# Patient Record
(demographics unavailable — no encounter records)

---

## 2024-11-13 NOTE — CARDIOLOGY SUMMARY
[Normal] : normal [___] : [unfilled] [No Ischemia] : no Ischemia [LVEF ___%] : LVEF [unfilled]% [de-identified] : Sinus  Rhythm  -Left axis -anterior fascicular block.   [de-identified] : November 8, 2023 Normal LV function LVEF

## 2024-11-13 NOTE — PHYSICAL EXAM
[Well Developed] : well developed [Well Nourished] : well nourished [No Acute Distress] : no acute distress [Normal Venous Pressure] : normal venous pressure [No Carotid Bruit] : no carotid bruit [Normal S1, S2] : normal S1, S2 [No Rub] : no rub [No Gallop] : no gallop [Clear Lung Fields] : clear lung fields [Good Air Entry] : good air entry [No Respiratory Distress] : no respiratory distress  [Soft] : abdomen soft [Non Tender] : non-tender [No Masses/organomegaly] : no masses/organomegaly [Normal Bowel Sounds] : normal bowel sounds [Normal Gait] : normal gait [No Edema] : no edema [No Cyanosis] : no cyanosis [No Clubbing] : no clubbing [No Varicosities] : no varicosities [No Rash] : no rash [No Skin Lesions] : no skin lesions [Moves all extremities] : moves all extremities [No Focal Deficits] : no focal deficits [Normal Speech] : normal speech [Alert and Oriented] : alert and oriented [Normal memory] : normal memory [General Appearance - Well Developed] : well developed [Normal Appearance] : normal appearance [Well Groomed] : well groomed [General Appearance - Well Nourished] : well nourished [No Deformities] : no deformities [General Appearance - In No Acute Distress] : no acute distress [Normal Conjunctiva] : the conjunctiva exhibited no abnormalities [Eyelids - No Xanthelasma] : the eyelids demonstrated no xanthelasmas [Normal Oral Mucosa] : normal oral mucosa [No Oral Pallor] : no oral pallor [No Oral Cyanosis] : no oral cyanosis [Normal Jugular Venous A Waves Present] : normal jugular venous A waves present [Normal Jugular Venous V Waves Present] : normal jugular venous V waves present [No Jugular Venous Casper A Waves] : no jugular venous casper A waves [Respiration, Rhythm And Depth] : normal respiratory rhythm and effort [Exaggerated Use Of Accessory Muscles For Inspiration] : no accessory muscle use [Auscultation Breath Sounds / Voice Sounds] : lungs were clear to auscultation bilaterally [Abdomen Soft] : soft [Abdomen Tenderness] : non-tender [Abdomen Mass (___ Cm)] : no abdominal mass palpated [Abnormal Walk] : normal gait [Gait - Sufficient For Exercise Testing] : the gait was sufficient for exercise testing [Nail Clubbing] : no clubbing of the fingernails [Cyanosis, Localized] : no localized cyanosis [Petechial Hemorrhages (___cm)] : no petechial hemorrhages [Skin Color & Pigmentation] : normal skin color and pigmentation [] : no rash [No Venous Stasis] : no venous stasis [Skin Lesions] : no skin lesions [No Skin Ulcers] : no skin ulcer [No Xanthoma] : no  xanthoma was observed [Oriented To Time, Place, And Person] : oriented to person, place, and time [Affect] : the affect was normal [Mood] : the mood was normal [No Anxiety] : not feeling anxious [Normal] : normal [Normal Rate] : normal [Rhythm Regular] : regular [No Murmur] : no murmurs heard [2+] : left 2+ [No Pitting Edema] : no pitting edema present

## 2024-11-13 NOTE — ASSESSMENT
[FreeTextEntry1] :  is now 67 years old and returns for a cardiovascular follow up. She carries a medical history as outlined below: -Hx of Hypertension -Hx of Hypothyroidism -Hx of SLE/ Rheumatoid arthritis -Hx of Right sided breast cancer (1996)-> S/P lumpectomy/Radiation/Chemo -Hx of Right sided breast cancer recurrence (2008)-> mastectomy/Radiation/ Chemo (Herceptin) -Hx of Left sided breast cancer - DCIS-(2016)-> S/P lumpectomy -> Tamoxifen X 5 years -Hx of S/P Left Total Knee Replacement-> 2022   #Hypertension   Blood pressure is within acceptable limits    Continue on Losartan 75 mg QD    Continue on HCTZ 25 mg QD    Requested BP monitoring at home  # Hypothyroidism    Reviewed TSH-> November 7, 2023, 1.92   Continue on Levothyroxine  75 mcg QD    Will repeat TSH  #Hx of RA   Continuing on Plaquenil 200 mg BID   #History of breast cancer-> s/p Chemo and radiation/ At risk for cardiomyopathy   Echocardiogram performed : November 8, 2023   Echo results:   Normal LV function   Normal diastolic function   No valvular issues   GLS- 21.3%  - Encouraged patient to continue with short intervals of   healthy walking and eating habits, focusing on a Mediterranean style of eating.  - Encouraged the patient to find healthy outlets and coping mechanisms to help manage stress, such as reducing workload if possible, spending time with family and friends, engaging in an enjoyable hobby, or using meditation or mindfulness techniques.  Repeating full blood work panel: CBC, CMP, Hgb A1C, TSH, Lipid panel  I spent 40 minutes evaluating patient's history, family medical history and blood pressure management, ordering tests and providing documentation.

## 2024-11-13 NOTE — REASON FOR VISIT
[Symptom and Test Evaluation] : symptom and test evaluation [Hyperlipidemia] : hyperlipidemia [Hypertension] : hypertension [Coronary Artery Disease] : coronary artery disease [FreeTextEntry1] : cardio-oncology, HLD, Hypothyroidism, lupus, rheumatoid , breast cancer, DMII

## 2024-11-13 NOTE — HISTORY OF PRESENT ILLNESS
[FreeTextEntry1] : 66  year old  women with: Hyperlipidemia elevated statin  HTN  on medication  Hypothyroidism  SLE  following with Rheum  Dr. Kasper  Rheumatoid arthritis- Dr Pricila Montiel PCP - Dr Micaela Erazo   Hx of  Right breast cancer initially diagnosed in 1996 s/p lumpectomy/XRT /chemotherapy with subsequent recurrence of breast CA noted in 2008 s/p mastectomy/XRT/chemotherapy (Herceptin).  DCIS in her left breast in December of 2016 s/p  lumpectomy on December 21, 2016 with no reported lymph node involvement. Breast surgeon, Dr. Anette Richardson ([facility] [place]She was started on Tamoxifen x 5 years s/p knee replacement.  Cardiac Testing: Nuclear exercise stress test in July of 2016 which ruled out any inducible ischemia. Echo 2016 Normal LV function. EF 69% Atrial septum aneurysmal, Mild shunting noted across the atrium septum  Interval follow-up February 24, 2021 Complains of positional dizziness Also here for routine cardio oncology follow-up ROS: Denies Chest pain, dyspnea, palpitations or syncope.  Interval follow up 9/3/21 vaccinate against covid compliant with medication/ diet  No recent hospitalization   Scale of 1/10: 5 sharp last 5 mins  also SOB with exertion No radiation to jaw, back arm Associated diaphoresis, palpitations, dizzy Alleviating factors: rest  Exacerbating factors:  activity /stairs etc   Also swelling in right index finger  on prednisone. Plaquenil   ROS: Denies  syncope; No recent hospitalization   Interval follow up 12/1/21 no more CP stress echo normal compliant with medication  tolerating statin   Interval Note September 7, 2022  Patient is now 66 years old that presents for a cardiovascular follow up. She carries a past medical history as outlined below:  -Hx of Hypertension -Hx hyperlipidemia -Hx of Hypothyroidism -Hx of SLE/ Rheumatoid arthritis -Hx of Right sided breast cancer (1996)-> S/P lumpectomy/Radiation/Chemo -Hx of Right sided breast cancer recurrence (2008)-> mastectomy/Radiation/ Chemo (Herceptin) -Hx of Left sided breast cancer - DCIS-(2016)-> S/P lumpectomy -> Tamoxifen X 5 years -Hx of S/P Left Total Knee Replacement-> 2022  Doing well.  PET- + LNs  Bronch Lymph nodes biopsy negative   newly diabetic hbaic 6.8 diet changes bean, rice, bread, sugar  compliant with medication  compliant with diet No recent hospitalization  No new medication   wants a endocrine recommendation  Interval Note May 10, 2023  Ms. Beasley is now 67 years old and returns for follow up Patient recently was seen by rheumatology due to arthralgias in her hands, writs and knees. Given a trigger injection of steroids into her left index finger. She was restarted on Plaquenil and began Prednisone 2.5 mg daily.  Blood pressure today: mildly elevated-> 143/ 84 EKG- Sinus rhythm @ 81 bpm,  stable and unchanged  Reviewed stress echo performed on September 29, 2021 LVEF  69% 7 METS of activity, fair for age and gender Normal stress echo with no evidence of inducible ischemia.   Patient otherwise reports feeling well, denies shortness of breath, chest discomfort or palpitations.   Interval Note November 8, 2023   is now 67 years old and returns for a cardiovascular follow up. She carries a medical history as outlined below: -Hx of Hypertension -Hx of Hypothyroidism -Hx of SLE/ Rheumatoid arthritis -Hx of Right sided breast cancer (1996)-> S/P lumpectomy/Radiation/Chemo -Hx of Right sided breast cancer recurrence (2008)-> mastectomy/Radiation/ Chemo (Herceptin) -Hx of Left sided breast cancer - DCIS-(2016)-> S/P lumpectomy -> Tamoxifen X 5 years -Hx of S/P Left Total Knee Replacement-> 2022  Blood pressure today:  146/ 82-> patient did not take her BP medication today EKG- Sinus rhythm @ 74 bpm   Patient presents today with ongoing complaints of numbness and tingling in her hands. She is being evaluated by neurologist, Dr. Robert Dias Recent Cervical MRI- demonstrated some cervical degeneration which may be contributory to these symptoms. Patient is scheduled to repeat a nerve conduction velocity test and EMG examination of the upper extremities to check progress of her carpal tunnel syndrome.  Interval Note May 8, 2024  Ms. Beasley is now 68 years old and returns for a cardiovascular follow up. She carries a history as outlined below: -Hx of Hypertension -Hx of Hypothyroidism -Hx of SLE/ Rheumatoid arthritis -Hx of Sjogren's -Hx of Right sided breast cancer (1996)-> S/P lumpectomy/Radiation/Chemo -Hx of Right sided breast cancer recurrence (2008)-> mastectomy/Radiation/ Chemo (Herceptin) -Hx of Left sided breast cancer - DCIS-(2016)-> S/P lumpectomy -> Tamoxifen X 5 years -Hx of S/P Left Total Knee Replacement-> 2022  Primary Care Doctor- Dr. Micaela Erazo Rheumatologist:  Dr. Lili Fiore  Blood pressure today:143/86-> repeat measurement: 134/74 EKG- Sinus rhythm, left axis @ 76 bpm   Reviewed recent echocardiogram from November 2023 Normal LV systolic function GLS  -21.3%  (normal) Normal LV diastolic function Normal RV cavity size, wall thickness and systolic function No pericardial effusion Mild AR Stable and unchanged.  Patient reports feeling well, continues to experience some chronic arthralgias-> left shoulder pain Her main complaints are centered around her knees due to laxity symptoms.  Interval Note November 13, 2024  Ms. Beasley returns for a cardiovascular follow up. She carries a history as outlined below:  -Hx of Hypertension -Hx of Hypothyroidism -Hx of SLE/ Rheumatoid arthritis -Hx of Sjogren's -Hx of Right sided breast cancer (1996)-> S/P lumpectomy/Radiation/Chemo -Hx of Right sided breast cancer recurrence (2008)-> mastectomy/Radiation/ Chemo (Herceptin) -Hx of Left sided breast cancer - DCIS-(2016)-> S/P lumpectomy -> Tamoxifen X 5 years -Hx of S/P Left Total Knee Replacement-> 2022  Blood pressure today: 149/82 EKG

## 2025-02-24 NOTE — ASSESSMENT
[FreeTextEntry1] : cts  worsening will need to see hand specialist and discussed treatment such as surgery she asked for a new  referral and wanted [facility] hand specilist   CTS is considered mild if there is numbness, tingling, or discomfort in the median nerve distribution but no sensory loss or weakness, no sleep disruption, and no difficulty with hand function or interference with activities of daily living (ADLs).   CTS is considered moderate if there is sensory loss in the median distribution, or if nocturnal symptoms occasionally disrupt sleep. Symptoms (sensory loss or pain) may interfere slightly with hand function, but the patient should be able to perform all ADLs.   CTS is considered severe if there is weakness in the median distribution, or if symptoms are disabling and prevent the patient from carrying out one or more ADLs, or if nocturnal symptoms routinely disrupt sleep.   ?Electrodiagnostic grading of CTS severity [1]:   Mild CTS is characterized by prolonged (relative or absolute) sensory latencies with normal motor studies. No evidence for axon loss.   Moderate CTS is characterized by abnormal median sensory latencies as noted for mild CTS, and relative or absolute prolongation of median motor distal latency. No evidence of axon loss.   Severe CTS is characterized by evidence of axon loss, as defined by any of the following:   -An absent or low-amplitude sensory nerve action potential (SNAP) or mixed nerve action potential   -A low-amplitude or absent thenar compound muscle action potential (CMAP)   -Needle electromyography (EMG) with fibrillation potentials or motor unit potential changes (large amplitude, long-duration motor unit potentials, or excessive polyphasics)   Initial therapy  Surgical decompression is advised for most patients with CTS who have severe median nerve injury, characterized by significant axonal degeneration on nerve conduction studies (NCS) or denervation on needle EMG (ie, reinnervated motor units and fibrillation potentials), unless there is a clear temporary precipitating factor such as pregnancy. Those who lack evidence of significant axonal loss or denervation can be treated initially with nonsurgical measures.  For patients with a clinical diagnosis of CTS who have not had electrodiagnostic studies, initial nonsurgical therapy is reasonable if clinical symptoms are mild (table 1). However, those with moderate to severe clinical symptoms who are candidates for surgery should have electrodiagnostic studies first to determine if there is significant axonal loss that might prompt a surgical referral, or if another etiology for median nerve damage can be determined. Patients lacking the electrodiagnostic features of axon loss can be treated initially with nonsurgical measures, even in the presence of severe clinical symptoms.  Among nonsurgical treatments, we suggest either nocturnal wrist splinting in the neutral position or a glucocorticoid injection as initial therapy. Both achieve similar results at six months, and an injection may offer slightly more short-term relief of symptoms at six weeks. Referral to an occupational therapist with subspecialty certification in hand therapy may improve outcomes. (See 'Wrist splinting' below.)  Failure of initial nonsurgical therapy  Although evidence is limited, combined treatment employing splinting with glucocorticoid injection(s), oral glucocorticoids, or other nonsurgical interventions may be more effective than the use of any single modality [2-5].  For patients who choose initial nocturnal splinting but remain symptomatic at one month, we suggest continuation of splinting for another one to two months while adding a different nonsurgical modality for CTS rather than stopping splinting. We suggest adding a single injection of methylprednisolone (20 to 40 mg) as the next therapeutic option; for patients who decline injection therapy, we suggest adding oral glucocorticoids (eg, prednisone 20 mg daily for 10 to 14 days). Oral glucocorticoid treatment should not extend beyond four weeks duration because of the deleterious side effects of prolonged glucocorticoid therapy. (See 'Glucocorticoid injection' below and 'Oral glucocorticoids' below.)  For patients who decline treatment with glucocorticoids, we suggest adding treatment with other measures such as physical and occupational therapy techniques (eg, carpal bone mobilization or nerve gliding) or yoga if available. However, the evidence of benefit for these treatment options is limited. (See 'Other nonsurgical options' below.)  Predictors associated with failure of conservative/nonsurgical therapy include the following [2,6-8]:  ?Long duration of symptoms (>6 to 12 months)  ?Age greater than 50 years  ?Constant paresthesia  ?Impaired two-point discrimination (>6 mm)  ?Positive Phalen sign <30 seconds  ?Prolonged motor and sensory latencies demonstrated by electrodiagnostic testing   Furthermore, clinical experience suggests that patients with significant evidence of nerve injury on electrodiagnostic studies (axonal degeneration on NCS or denervation on needle EMG) seldom benefit from splinting or other nonsurgical interventions for CTS. Surgical decompression may be beneficial even for patients with CTS who lack evidence of axonal loss or denervation if symptoms do not respond to an adequate trial of nonsurgical measures. (See 'Surgical decompression' below.)  Electrodiagnostic studies should be obtained prior to surgical treatment 2 prediabetes  GLYCEMIC INDEX: Foods can be measured by how much they are likely to raise blood sugar. This is called the glycemic index. We want you to eat mostly foods that have a low glycemic index.  Fruit: choose cherries, grapefruit, prunes, dried apricots, apples, peaches, pears, blueberries, strawberries, oranges, and grapes.  Breads and other starches: Choose pumpernickel, rye, sourdough, or stone-ground whole wheat bread. For cereal, choose bran flakes, oat flakes, and oatmeal. For rice, use long-grained white rice. Most pasta is fairly low on the glycemic index; whole wheat or egg pasta is the lowest. Choose new or sweet potatoes and yams.  Dairy products: Dairy tends to be fairly low on the glycemic index because of the protein and fat in it. Premium ice cream is lower on the glycemic index than low-fat ice cream.  Salty snacks: Hummus, peanuts, walnuts, and cashews are all good choices.  Sweets: Most sweets are high on the glycemic index, so make them special treats only. Ones that have protein and fat in them tend to be a bit lower. Milk chocolate or peanut candies are good choices. Pound and sponge cakes are lower on the glycemic index than other types of cakes. For cookies, choose peanut butter, chocolate chip, butter, and oatmeal cookies. For a breakfast treat, choose scones or oatmeal muffins.  Beans: Choose [place], chickpeas (garbanzo beans), lentils, split peas, lima beans, and kidney beans.  Meat and vegetables are low on the glycemic index. Soups and stews made with meat or vegetables are also good. 3 hypothyroid check levels  4 breast nodule us breast and mammogram  5 colon cancer screening [place] in . [place] controlled   DIETARY SALT (SODIUM); DASH DIET AND BLOOD PRESSURE: To decrease the sodium in your diet:   Use fresh vegetables and foods as much as possible.  Avoid canned and processed foods. Cured meats such as quick, ham, and sausages are high in salt.  Try using different herbs and spices in your cooking instead of salt.  In restaurants, avoid foods with sauces, cheese, and cured meats. Ask for low-sodium choices. To get more potassium in your diet, eat:  Bananas, fresh or dried apricots, peaches, citrus fruits, melons  Cauliflower, broccoli, tomatoes, carrots, raw spinach, beet greens, potatoes To get more magnesium in your diet, eat:  Whole grain foods, leafy green vegetables, dried fruits  Fish and seafood, poultry  To get more calcium in your diet, eat:  Nonfat milk, yogurt, and low-fat cheeses   [place] and sardines  Cooked dried beans  Broccoli, kale, and bok manny  Tofu or soybean curd DASH stands for "dietary approaches to stop hypertension." The DASH diet is low in total and saturated fat. It is rich in fruits, vegetables, and low-fat dairy foods. The diet allows you to get natural fiber, calcium, and magnesium from food. It prevents or lowers high blood pressure. It can also help lower cholesterol in your blood.  Don't change how you eat all at once. It's much more likely that you'll succeed if you make only one or two small changes at a time. Wait until those changes are a habit, then make a couple more changes. Some good starting steps include:  Add one serving of vegetables to your meals at lunch and dinner. This is an easy way to help you get more vegetables in your diet.  Have a piece of fruit as an afternoon or after-dinner snack. One glass of juice at breakfast is not enough fruit in your diet.  Use half your usual amount of butter, margarine, or salad dressing.  Buy nonfat salad dressing or nonfat sour cream. Follow this guide to select your menu of meals. The number of calories we want you to eat each day will tell you how many servings you can choose from each food group. Calories: 1600 2100 2600 3100 Servings Grains 6 7  10  12  Vegetables 	 4 4  5 6 Fruit 4 5 5 6 Dairy (low-fat) 2  3 3 3  Meat, poultry, fish  1  2 2  Nuts, seeds    1 Fats and sweets 1  2  3 4 Grains and grain products like breads and cereals provide energy, fiber and vitamins. Whole grains have more of these nutrients. One serving equals one of the following: Bagel, 1/2 medium; Barley, cooked 1/2 cup; Biscuit, country style 1 medium; Bread, whole wheat, white 1 slice; Cereals, cold or cooked, 1/2 cup; Cornbread, 1 medium piece; Crackers, brittani, 2; Crackers, saltine, 4; Dinner roll, 1medium; English muffin, ; Hamburger bun, ; Muffin, 1 medium; Pancake, 1 medium; Pasta, 1/2 cup cooked; Chrissy, 1/2 large or 1 small; Popcorn, 1 cup popped; Pretzels, 1 ounce; Rice, white, brown, or wild, 1/2 cup cooked; Tortillas, corn or flour, 1 medium; Waffle, 1 medium; Wheat germ, 1/4 cup;  Vegetables are rich sources of potassium, magnesium, and fiber. One serving is 1/2 cup of any of the following cooked vegetables: Asparagus, Beans (green, yellow), Beets, Broccoli, [place] Sprouts, Carrots, Cauliflower, Brice, chicory, mustard and turnip (and other) greens, Corn, Kale, Lima beans, Mixed vegetables, Okra, Parsnips, Peas, green, Potatoes (1/2 medium or 1/2 cup mashed), Pumpkin, Rutabaga, Spaghetti or tomato sauce, Spinach, Squash (zucchini or yellow), Stewed tomatoes, Succotash, Sweet potatoes, Turnips, Yam  Raw vegetables: Carrots,1/2 cup; Celery, 1/2 cup; Lettuce (porter, loose-leaf, green-leaf), 1 cup; Peppers, 1/2 cup; Spinach, 1 cup; Tomato, 1/2 Fruits and fruit juices are important sources of potassium and magnesium. Fruits also contain fiber and are low in sodium and fat. One serving equals: Any fruit juice, # cup (6 ounces); Canned or frozen fruit,  cup (includes applesauce); Dried fruit,  cup;  Fresh fruit: Apple, 1 medium; Apricots, 2 medium; Banana, 1 medium; Berries, 1/2 cup; Melon, 1 wedge, or 1/2 cup; Cherries, 10; Grapefruit, 1/2; Grapes, 15; Kiwi, 1 medium; Edwin, 1/2 small; Nectarine, 1 medium; Orange, 1 medium; Peach, 1 medium; Pear, 1 medium; Pineapple, 1/2 cup; Plums, 2 medium; Tangerine, 1 large Dairy foods provide protein and calcium. Use low-fat or nonfat dairy products to cut down on fat. One serving equals: Skim milk, 1 cup (8 ounces); 1% low fat milk, 1 cup (8 ounces); 2% low fat milk, 1 cup (8 ounces) nonfat dry milk powder (1/3 cup); Low-fat cottage cheese, 1 cup (8 ounces); Parmesan cheese, 1 tablespoon; Mozzarella cheese, part skim, 1/4 cup (1 ounce); Low-fat cheddar cheese, 11/2 ounces; Ricotta cheese, part skim milk or nonfat, 1/4 cup (11/2 ounces); Other low fat or nonfat cheeses (11/2 oz.); Low-fat or nonfat yogurt, fruit-flavored or plain, 1 cup (8 ounces) Low-fat or nonfat frozen yogurt, 1/2 cup (4 ounces); Note: People who can't digest dairy products can try taking lactase enzyme pills or drops (available at drug and grocery stores) when they eat dairy. There is also milk available with the enzyme already added. Or you can buy lactose-free milk. Meat, poultry, and fish are good sources of protein and magnesium. One serving equals: Lean meat including beef, veal, or pork, 3 ounces cooked; Skinless, white meat poultry including turkey, chicken, 3 ounces; Fish and shellfish, 3 ounces cooked; Low-fat luncheon meats, 1 ounce; Egg, 1 medium; Tofu, 3 ounces Note: Three ounces of cooked meat is about the size of a deck of cards. Nuts, seeds, and legumes are rich sources of energy, magnesium, potassium, protein and fiber. Nuts and seeds are also high in fat, so portions should be small. Almonds, 1/3 cup; Beans such as kidney, rios, and navy, 1/2 cup cooked; Chickpeas and lentils, 1/2 cup cooked; Cashews, 1/3 cup; Filberts, 1/3 cup; Mixed nuts, 1/3 cup; Peanut butter, 2 tablespoons; Peanuts, 1/3 cup; Sesame seeds, 2 tablespoons; Sunflower seeds, 2 tablespoons; Tofu, regular, 3 ounces; Walnuts, 1/3 cup  Following the above diet will give you about 27% fat in your diet. The goal is to have 30% or less of the calories you eat each day be from fat. To meet that goal, do not eat more than 2-3 servings daily of added fat. Also try to limit sweets. One serving equals: Butter or margarine, 1 teaspoon; Mayonnaise, 1 teaspoon; Low-fat mayonnaise, 1 tablespoon; Salad dressing, 1 tablespoon; Low-fat salad dressing, 2 tablespoons; Oil, 1 teaspoon (use olive, canola, safflower, or other vegetable oils); Candy, hard, 3 pieces; Jelly or jam, 1 tablespoon); Jell-O, 1/2 cup; Jelly beans, 1/2 ounce; Maple syrup, 1 tablespoon; Popsicle, 1; Sherbet or nonfat or low-fat frozen yogurt, 1/2 cup; Sugar, 1 tablespoon; Sugared lemonade or fruit punch, 1 cup (8 ounces); Note: Try diet fruit-flavored gelatin or frozen, canned, or fresh fruit for dessert.  Small amounts of alcohol may have benefits to the heart and blood pressure. However, excess use of alcohol can cause damage to the brain, liver and other organs. It can lead to high blood pressure. Drinking more than two drinks (15 ml) every day can raise your blood pressure. 15 ml of alcohol equals:   one 12-ounce bottle of beer   a half glass (5 ounces) of wine   1 ounce (one shot) of 100 proof hard liquor  7trigger finger   trigger finger- The goals of treatment of trigger finger are to alleviate pain and to allow smoother movement of the finger with flexion and extension. A variety of therapeutic options ranging from conservative to more invasive are presented below.  Overall approach  Our initial approach to therapy is usually to start with conservative interventions which include activity modification, splinting, and/or short-term nonsteroidal antiinflammatory drugs (NSAIDs). A local glucocorticoid injection may be offered to patients whose symptoms have not resolved with conservative management.  Patients who present with severe symptoms or frequent episodes of triggering may benefit from a glucocorticoid injection at the initial presentation. Surgical release is generally reserved for patients who have failed conservative therapy and have not improved with one or two glucocorticoid injections.  Acute symptoms  We suggest initial management with either activity modification or splinting. Patients may continue with normal activity but avoid potentially aggravating movements such as pinching or grasping of the fingers. Several small observational studies have also shown that immobilization of the metacarpophalangeal (MCP) joint with splinting with a metal finger splint or a custom made thermoplastic splint can also help alleviate pain and reduce triggering . As an example, a study including 28 patients with trigger finger treated with a custom thermoplastic splint found statistically significant improvements across a variety of outcome measures, including the number of triggering events]. In addition, approximately 93 percent of patients felt that their triggering had improved after 6 to 10 weeks of splint wear, and 54 percent of patients felt that their symptoms had completely resolved.  The splint should keep the MCP joint in slight flexion and can be worn based on trigger pattern and patient preferences (eg, daytime use, nighttime use, or use with activity). The suggested duration of splinting is generally three to six weeks. For milder cases, primary care clinicians may suggest taping the affected finger with the adjacent normal fingers (yousuf taping) to limit flexion of the finger.  We also suggest a concurrent trial of NSAIDs for pain relief, unless contraindicated by gastrointestinal, renal, or heart disease. NSAIDs should be taken for a maximum duration of two to four weeks. (See "Initial treatment of rheumatoid arthritis in adults", section on 'NSAIDs'.)  Persistent symptoms  For patients whose symptoms do not improve with activity modification, splinting, and/or judicious use of NSAIDs, additional therapeutic options include glucocorticoid injections and surgery.  Glucocorticoid injection  A local glucocorticoid injection is suggested for patients whose symptoms have not resolved after four to six weeks of conservative therapy (eg, splinting)  We use an intermediate-acting glucocorticoid for injection such as methylprednisolone or triamcinolone mixed with a local anesthetic such as lidocaine. As mentioned above, a glucocorticoid injection may be offered sooner to patients who present with severe locking and incomplete finger flexion or extension. The injection may be repeated in six weeks if symptoms have not improved by at least 50 percent, with a maximum of three injections. However, the vast majority of patients experience significant improvement with a single injection. Persistent relief beyond 12 months is expected in approximately 50 percent of patients. Glucocorticoid injections for trigger finger in patients with diabetes are generally less successful .  The efficacy of glucocorticoid injections for trigger finger in adults has been demonstrated in numerous observational studies, with relatively lasting effects . As an example, a study including 366 patients with trigger finger found that at least 45 percent of patients experienced relief lasting up to 10 years after a single glucocorticoid injection  In addition, a systematic review including two randomized trials of poor methodological quality found that glucocorticoid injection was more effective for symptom relief than lidocaine injection alone after four weeks . Another trial randomly assigned 50 patients with trigger finger to receive either local glucocorticoid or placebo injection and found that patients who received a glucocorticoid injection had a significantly greater improvement in pain and reduction in triggering, which persisted during the 12-month follow-up period 8 sicca  In addition to the classic symptoms associated with Sjgren's syndrome (dry mouth and dry eyes), people with the disorder also have a higher risk of developing diseases of the lung (called interstitial pneumonitis), diseases of the kidneys (interstitial nephritis), and thyroid gland abnormalities. Some may develop inflammation of blood vessels (vasculitis). Vasculitis can cause a characteristic rash and can lead to skin, nerve, and/or internal organ damage. Sjgren's syndrome also increases the risk of a cancer of the lymphatic system (most commonly non-Hodgkin lymphoma). The lymphatic system includes the tissues and organs that produce and store cells that fight infection, including the bone marrow, spleen, thymus, and lymph nodes. (: Diffuse large B cell lymphoma in adults Follicular lymphoma in adultsThe skin may be affected by dryness (xerosis) and various types of rashes, including small "blood spots" on the lower legs (purpura, stemming from inflammation of the blood vessels), vasculitis (particularly small vessel involvement), and red ring-like lesions with a central pale area (annular erythema). Neurologic involvement includes damage to peripheral nerves, leading to uncomfortable sensations in the skin and/or distal parts of the extremities, such as the hands and feet, including burning, numbness, or discomfort with light touch. Less common forms can lead to imbalance and poor coordination. Sjgren's syndrome can also affect the brain and spinal cord. The most common form of this involvement is demyelination (damage to the myelin, which is the material that covers and protects nerves), leading to symptoms and signs similar to what is seen in multiple sclerosis. Blood involvement can result in low red blood cell counts or anemia (sometimes leading to fatigue and shortness of breath), low white cell counts (sometimes leading to frequent infections), and low platelet counts (sometimes leading to bleeding). SJGREN'S SYNDROME TREATMENTTreatment of Sjgren's syndrome can be divided into three basic areas?Treatment of problems such as oral yeast infections, eyelid irritation (blepharitis), and acid reflux. These problems can complicate Sjgren's syndrome and can make the condition less responsive to other therapies. ?Treatment of fatigue and/or vague symptoms of poor concentration and of impaired memory (such as fibromyalgia). Treatment of dry eyes and dry mouth  Most people use artificial tears dry eyes. Many different solutions are available; a clinician can recommend an appropriate choice based upon your pattern of dryness and fluid production in the eye. Some people are sensitive to the preservatives found in artificial tears ointments-patient-drug-information?you can try a brand with a non-irritating preservative. Alternatively, there are preservative-free versions of moisturizing eye drops. These should be used if you instill the drops in your eyes four or more times a day. They come in small, single-dose containers that may be hard to open for some people who have joint and/or vision problems. Prescription eye drops containing the medications cyclosporine  which suppress part of the local immune reaction, are also available. Some people with severe eye dryness require use of a tear made from their own serum. At night, you can use an eye ointment to provide moisture. It is important to use only a small amount (about 1/8 inches or 3 mm) of the ointment, because overuse can block the ducts and can lead to a condition called blepharitis. Some people try taking omega-3 fatty acid supplements (eg, fish oil) to help with their dry eyes, although evidence is mixed and it is not clear whether this is effective. Preserving natural tears  There are various measures you can try to preserve your own tears. [place] can be fitted on the sides of glasses, helping to protect the eyes from air and wind and reducing evaporation of tears. Goggles or wraparound sunglasses serve a similar function. Another approach is a simple procedure called punctal occlusion. In this procedure, an ophthalmologist inserts tiny plugs into the tear ducts in the corner of the lower eyelid, nearest the nose, where the tears normally collect and drain into the nose. By blocking this duct, your tears stay on the eye longer. There are several types of plugs, one of which does not touch the surface of the eyeball; these plugs are generally preferred. Stimulating saliva  Simply sucking on sugar-free candy or lozenges or chewing sugar-free gum can stimulate the flow of saliva. Products that contain xylitol can help reduce the risk for dental decay. In some people who do not respond adequately to such measures, medications (eg, pilocarpine can be given to increase saliva production. Replacing secretions in the mouth  Sipping on water throughout the day is an easy and effective treatment of dry mouth for many people. The water does not have to be swallowed. It can be rinsed around the mouth and then spit out. If this does not help, an artificial saliva </[email or web address]-saliva-patient-drug-information?[email or web address]_link> product (spray or lozenge) may be helpful. If you have painful gums, a gel that relieves dry mouth can be helpful. Preventing cavities  People with Sjgren's syndrome are at increased risk for dental cavities. You should brush and floss after eating meals and snacks. An electric toothbrush is preferred. It is important for you to visit your dentist at least every six months for a cleaning and evaluation. Toothpaste  Toothpastes designed specifically for people with dry mouth are available. These lack the detergents that are present in many types of toothpaste but can irritate a dry mouth. Toothbrushes with special features that help clean between the teeth (including electric toothbrushes) may also help to keep your teeth clean.  Toothpaste with fluoride (or a special fluoride rinse or varnish) may help to prevent cavities. A fluoride treatment after each dental cleaning may also be helpful. Other products that help preserve dental integrity include chewing gums that keep the pH neutral on the dental surface and toothpastes that bind calcium and phosphate ions to tooth surfaces.  Dryness in other areas  People with Sjgren's syndrome may have dryness in other areas, including the lips, the skin, and the vagina. For dry lips, you can use petroleum jelly or lip balms or salves. Dry skin usually improves with frequent and liberal use of a moisturizing cream or ointment. "Extra dry skin" lotion can be applied in the morning and at bedtime (and after baths or showers).  Some women with Sjgren's syndrome have difficulty with vaginal dryness, especially after menopause. There are several products specifically designed for vaginal dryness, including vaginal moisturizers, estrogen cream, vitamin E oil, hyaluronic acid suppositories, and vaginal lubricants. Adequate artificial lubrication applied to BOTH partners can prevent painful intercourse. (See "Patient education: Vaginal dryness (Beyond the Basics)".)  Treating other Sjgrens-related problems  Fungal infections in the mouth  Prescription medications are available to treat painful mouth lesions due to oral candidiasis (yeast infection, also called thrush). These are taken as either a daily pill or as a topical treatment, in the form of a kenyatta (which is allowed to dissolve in the mouth) or a solution (which is swished around in the mouth before swallowing).  If you wear dentures and have had a fungal infection, be sure to disinfect them overnight to prevent the fungus from coming back. Your dentist can prescribe a nystatin suspension for this purpose.  Dry nose  It is important to treat dry nose or stuffiness because blocked nasal passages can increase mouth breathing and can worsen dry mouth. Saline nasal sprays are available in most drugstores.  Other causes of nasal blockage, including allergy or sinus infection, should be treated promptly. (See "Patient education: Allergic rhinitis (Beyond the Basics)" and "Patient education: Nonallergic rhinitis (runny or stuffy nose) (Beyond the Basics)".)  Blepharitis (eyelid inflammation)  Eyelid inflammation, also called blepharitis, causes symptoms that are similar to those of dry eye (swollen lids and redness of the inside of the lids). Gently washing the skin of the eyelids can relieve blepharitis. You can do this with a warm, wet washcloth and a small amount of "no tears" shampoo or non-soap face cleanser. With the eyes closed, the excess debris should be rubbed from the inner eye outward to the outer eye area. (See "Blepharitis".)  Reflux (heartburn)  Acid reflux is more common in people with Sjgren's syndrome. This is probably due to the decreased production of saliva, which normally helps to reduce the acidity of stomach acid. Treatment of reflux in people with Sjgren's syndrome is similar to treatment in other people. (See "Patient education: Gastroesophageal reflux disease in adults (Beyond the Basics)".)  Joint and muscle pain  Nonsteroidal antiinflammatory drugs (NSAIDs) such as ibuprofen are recommended for the joint pain that may accompany Sjgren's syndrome. (See "Patient education: Nonsteroidal antiinflammatory drugs (NSAIDs) (Beyond the Basics)".)  Low-dose glucocorticoids (also called steroids) such as prednisone may also improve joint pain but are generally recommended for short-term treatment. Serious side effects may occur with long-term use (eg, weight gain, high blood pressure, diabetes, bone thinning).  A class of medications called disease-modifying antirheumatic drugs (DMARDs) is commonly used in people with lupus and rheumatoid arthritis to slow the immune system's destructive effects. Similar treatments have been used in patients with Sjgren's syndrome. The most common is hydroxychloroquine, an antimalarial drug, which is widely used in the treatment of lupus and rheumatoid arthritis. (See "Patient education: Disease-modifying antirheumatic drugs (DMARDs) in rheumatoid arthritis (Beyond the Basics)" and "Treatment of Sjgren's syndrome: Constitutional and non-sicca organ-based manifestations", section on 'Musculoskeletal pain'.)  Fatigue  Fatigue is common in Sjgren's syndrome. Fatigue may be due to the active inflammation associated with the disease itself, fibromyalgia, and/or sleep disturbances. Sleep problems can result if you drink a lot of water to treat dry mouth and then need to get up frequently at night to urinate.  General treatment for fatigue includes the attention to diet and exercise that has been helpful in people with fibromyalgia. In addition, in people with Sjgren's syndrome, it is important to adequately control symptoms of dry mouth and dry eye that interfere with sleep (table 1). (See "Patient education: Insomnia (Beyond the Basics)".)  Fibromyalgia  Some people with Sjgren's syndrome also have a condition called fibromyalgia. Fibromyalgia causes muscle aching and fatigue. The treatment of fibromyalgia is discussed separately. (See "Patient education: Fibromyalgia (Beyond the Basics)".)  Vasculitis  Vasculitis is inflammation of blood vessels. Damage to arteries or veins may result in bleeding, pain, and damage to skin, nerves, and internal organs. When vasculitis occurs, it often requires treatment with drugs that suppress the immune system. Medications such as rituximab, cyclophosphamide, azathioprine, or mycophenolate mofetil may be prescribed by clinicians experienced in their use. Careful monitoring for side effects and for response to treatment is necessary. (See "Patient education: Vasculitis (Beyond the Basics)".)  Anesthesia and Sjgren's syndrome  If you need surgery, the anesthesiologist should be made aware of your diagnosis of Sjgren's syndrome. This is because Sjgren's syndrome can increase the risks of general anesthesia. There may be an increased risk of developing mucous plugs in the airways during and after surgery, and medications used during the surgery can dry the airways further. If aware of the diagnosis of Sjgren's syndrome, the anesthesiologist can take special measures to lower the risk of these complications.  Pregnancy  Pregnancy outcomes in women with Sjgren's syndrome are generally similar to those of healthy women. However, women with Sjgren's syndrome who have anti-Ro/SSA antibodies have a small risk of giving birth to a baby with a condition called " lupus," which sometimes includes the baby being born with a potentially serious heart problem called congenital heart block. (See 'Blood tests' above.)   lupus with congenital heart block occurs in about 2 to 4 percent of newborns of mother with Sjgren's syndrome who have these antibodies. Other features of  lupus, including skin rashes (which are seen more often than the heart block), abnormal blood counts, and liver abnormalities, are all usually temporary, unlike the heart block. It is important to detect the heart block early, which can be done by monitoring during pregnancy. Thus, women with these antibodies who become pregnant should be monitored by a maternal-fetal medicine specialist as part of their care during pregnancy. 9 hld to lower  LDL and non HDL  cholesterol levels     1 limit your intake of foods full of saturated fats  , trans fats, and dietary cholesterol .  Food with a lot of saturated fate include butter, fatty flesh like red meat, full fat and low fat dairy  products  , palm oil and coconut oil .   If you see partially hydrogenated fat in the ingredient  list of food label that food has trans fats.  Top sources of dietary chol include egg yolks , organ meats and shell fish.  one Type of fat omega 3 Fatty acids has been shown to protect against heart disease  . Good sources are cold water fish like salmon, mackerel , halibut ., trout  herring and sardines.     Limit  your intake of meat , poultry and fish to no more than 3.5  ounces per day     Eat a lot more fiber rich foods  like beans , oats, barley fruits and vegetables   .  Food naturally rich in soluble fiber  are good at lowering cholesterol .    Excellent choices  are  oats , oat bran , barley , peas , yams sweet potatoes and legumes  or beans .  Good fruit sources are berries passion fruit, oranges pears,, apricots , apples  and nectarines  .    choose protein rich plant foods   such as legumes or beans nuts and seeds over meat.     Lose as much weight as possible and exercise   Take plant sterol supplements   .A Daily intake  of 1-2 gms  of plant sterols  lowers ldl .    Best choice is supplements  such as Cholestoff  by natures made   because they dont have calories  sugar trans fats   an salt  of many foods enriched with plant sterols.    Take  Metamucil or psyllium   .   Studies have shown 9-10 gms as daily of psyllium   the equivalent of  about  3 teaspoons  of sugar free Metamucil   reduced LDL levels  .

## 2025-02-24 NOTE — HISTORY OF PRESENT ILLNESS
[FreeTextEntry1] : fu  [de-identified] : Pt is a 68 yr old woman  with cts, arthralgia, auto immune disease , gerd hep c , hx of chemotherapy, hld, hypothyroid, prediabetes, breast cancer sicca Sjogren's, sleep disturbance , hpn, and gerd who  just returned form [facility] .  she was to have a colon cancer screening but was sick with RSV virus  and was canceled .she will be rescheduled  for April or March. pt states since returning she has body aches cough  and fatigue and was up state and is feeling better but still congested. her  is now ill with similar  symptoms.  she also states her carpal tunnel is worse  on right hand  and also on left.  She is wearing brace daily and has trigger finger  right  4th finger.Recent Cervical MRI- demonstrated some cervical degeneration which may be contributory to these symptoms. Patient is scheduled to repeat a nerve conduction velocity test and EMG examination of the upper extremities to check progress of her carpal tunnel syndrome.   She has weakness in her right hand and dropping things and cant  hold anything in her right hand. Hx of Right breast cancer initially diagnosed in 1996 s/p lumpectomy/XRT /chemotherapy with subsequent recurrence of breast CA noted in 2008 s/p mastectomy/XRT/chemotherapy (Herceptin). 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 Breast surgeon, Dr. Anette Richardson ([facility] [place]).She was started on Tamoxifen x 5 years s/p knee replacement.   She has seen her cardiologist stress echo performed on September 29, 2021 LVEF 69% 7 METS of activity, fair for age and gender Normal stress echo with no evidence of inducible ischemia.Reviewed recent echocardiogram from November 2023 Normal LV systolic function   GLS -21.3% (normal) Normal LV diastolic function Normal RV cavity size, wall thickness and systolic function No pericardial effusion Mild AR Stable and unchanged. Pt has not been checking her blood sugars because her machine broke.  she -denies any headaches, nausea, vomiting, fever, chills, sweats, chest pain, chest pressure, diarrhea, constipation, dysphagia, sour taste in the mouth, dizziness, leg swelling, leg pain, myalgias, arthralgias, itchy eyes, itchy ears, heartburn, or reflux.

## 2025-02-24 NOTE — HEALTH RISK ASSESSMENT
[No] : In the past 12 months have you used drugs other than those required for medical reasons? No [One fall no injury in past year] : Patient reported one fall in the past year without injury [Little interest or pleasure doing things] : 1) Little interest or pleasure doing things [Feeling down, depressed, or hopeless] : 2) Feeling down, depressed, or hopeless [0] : 2) Feeling down, depressed, or hopeless: Not at all (0) [PHQ-2 Negative - No further assessment needed] : PHQ-2 Negative - No further assessment needed [Aggressive treatment] : aggressive treatment [Never] : Never [de-identified] : cardiologist  Dr Breaux  [de-identified] : none  [de-identified] : none  [de-identified] : slipped at home  [[id number]] : 0 [AdvancecareDate] : 02/23/25

## 2025-02-24 NOTE — COUNSELING
[Fall prevention counseling provided] : Fall prevention counseling provided [Adequate lighting] : Adequate lighting [No throw rugs] : No throw rugs [Use proper foot wear] : Use proper foot wear [Sleep ___ hours/day] : Sleep [unfilled] hours/day [Engage in a relaxing activity] : Engage in a relaxing activity [Plan in advance] : Plan in advance [Potential consequences of obesity discussed] : Potential consequences of obesity discussed [Benefits of weight loss discussed] : Benefits of weight loss discussed [Structured Weight Management Program suggested:] : Structured weight management program suggested [Encouraged to maintain food diary] : Encouraged to maintain food diary [Encouraged to increase physical activity] : Encouraged to increase physical activity [Weigh Self Weekly] : weigh self weekly [Decrease Portions] : decrease portions [____ min/wk Activity] : [unfilled] min/wk activity [Keep Food Diary] : keep food diary [FreeTextEntry2] : bmi 28 weight 139 [None] : None

## 2025-02-24 NOTE — END OF VISIT
[Time Spent: ___ minutes] : I have spent [unfilled] minutes of time on the encounter which excludes teaching and separately reported services.
None

## 2025-02-24 NOTE — PHYSICAL EXAM
absent [No Acute Distress] : no acute distress [Well-Appearing] : well-appearing [Normal Sclera/Conjunctiva] : normal sclera/conjunctiva [Normal Outer Ear/Nose] : the outer ears and nose were normal in appearance [No JVD] : no jugular venous distention [No Lymphadenopathy] : no lymphadenopathy [Supple] : supple [Rate ___] : at [unfilled] breaths per minute [Normal Rhythm/Effort] : normal respiratory rhythm and effort [Clear Bilaterally] : the lungs were clear to auscultation bilaterally [Normal to Percussion] : the lungs were normal to percussion [Normal Rate] : normal [Heart Rate ___] : [unfilled] bpm [Rhythm Regular] : regular [Normal S1] : normal S1 [Normal S2] : normal S2 [S3] : no S3 [S4] : no S4 [No Murmur] : no murmurs heard [No Pitting Edema] : no pitting edema present [Rt] : no varicose veins of the right leg [Lt] : no varicose veins of the left leg [Right Carotid Bruit] : no bruit heard over the right carotid [Left Carotid Bruit] : no bruit heard over the left carotid [Right Femoral Bruit] : no bruit heard over the right femoral artery [Left Femoral Bruit] : no bruit heard over the left femoral artery [2+] : left 2+ [No Abnormalities] : the abdominal aorta was not enlarged and no bruit was heard [Bruit] : no bruit heard [No Edema] : there was no peripheral edema [No Extremity Clubbing/Cyanosis] : no extremity clubbing/cyanosis [___] : a [unfilled] ~Ucm mastectomy scar [Breast Nipple Inversion Left] : not inverted [Breast Nipple Retraction Left] : not retracted [Breast Nipple Flattening Left] : not flattened [Breast Nipple Fissures Left] : not fissured [Breast Abnormal Lactation (Galactorrhea) Left] : no galactorrhea [Breast Abnormal Secretion Bloody Fluid Left] : no bloody discharge [Breast Abnormal Secretion Serous Fluid Left] : no serous discharge [Breast Abnormal Secretion Opalescent Fluid Left] : no milky discharge [___cm] : a ~M [unfilled] ~Ucm superior medial quadrant mass was palpated [Mobile] : mobile [Tender] : tender [12:00] : in the 12:00 position [___cm Radial Distance from the Nipple] : [unfilled] ~Ucm radially from the nipple [Axillary Lymph Nodes Enlarged Bilaterally] : no enlarged nodes [Soft, Nontender] : the abdomen was soft and nontender [No Mass] : no masses were palpated [No HSM] : no hepatosplenomegaly noted [Normal Axillary Nodes] : no axillary lymphadenopathy [Normal Posterior Cervical Nodes] : no posterior cervical lymphadenopathy [Normal Anterior Cervical Nodes] : no anterior cervical lymphadenopathy [No CVA Tenderness] : no CVA  tenderness [Motor Handedness Right-Handed] : the patient is right hand dominant [Motor Strength Upper Extremities Right] : there was weakness of the right upper extremity [None] : no muscle rigidity was observed [Involuntary Movements] : no involuntary movements were seen [Coordination Grossly Intact] : coordination grossly intact [Normal Gait] : normal gait [Normal] : affect was normal and insight and judgment were intact [de-identified] : erythema of throat mild erythema of nasal mucosa  [de-identified] : postive tinnel sign

## 2025-02-26 NOTE — PHYSICAL EXAM
[de-identified] : Constitutional: Height and weight as reported by patient. Well groomed and in no apparent respiratory distress. Cardiovascular: No lower extremity edema. No lower extremity varicosities. Neurologic/Psychiatric: Oriented to time, place and person. Mood and affect within normal limits. Skin: No observed rashes on lower extremities.  Gait: nonantalgic with no assistive device, arising from the seated position independently. Spine: cervical spine appears normal and moves freely, thoracic spine appears normal and moves freely, lumbosacral spine appears normal and moves freely.   Right knee Inspection: trace effusion Wounds: none. Alignment: normal. Palpation: medial tenderness on palpation. ROM active (in degrees): 0-130 with discomfort and crepitus Ligamentous laxity: all ligaments appear stable,, negative ant. drawer test, negative post. drawer test, stable to varus stress test, stable to valgus stress test. negative Lachman's test, negative pivot shift test Meniscal Test: negative McMurrays, negative All. Patellofemoral Alignment Test: Q angle-, normal. Muscle Test: good quad strength.  Left Knee Inspection: no effusion Wounds: healed midline incision Alignment: normal. Palpation: no specific tenderness on palpation. ROM: Active (in degrees): 0-125 Ligamentous laxity (neg): negative ant. drawer test, negative post. drawer test, stable to varus stress test, stable to valgus stress test, Patellofemoral Alignment Test: Q angle-, normal. Muscle Test: good quad strength. Leg examination: calf is soft and non-tender. [de-identified] : Left knee xray merchant view taken at the office today demonstrates a total knee replacement in satisfactory position and alignment. No evidence of loosening. The patella sits in a central position. No change from priors.  Right knee xrays, standing AP/Lateral and Merchant films, and 45 degree PA standing view, taken at the office today shows Diffuse tricompartmental degenerative arthritis, medial joint space narrowing, patella at appropriate height and central position, traction spur superior pole of patella, Patellofemoral Joint space narrowing, significant osteophytes Kellgren and David grade 3-4

## 2025-02-26 NOTE — HISTORY OF PRESENT ILLNESS
[FreeTextEntry1] : Patient explains discontinuing hydroxychloroquine and is off of cs tx.  She explains continued dropping of objects from the RT hand with accompanied paresthesia, now experienced in both hands. She is to see hand colleagues shortly; evaluation reflects RT CTS, Neurology evaluation appreciated. Patient had received low-dose prednisone therapy early 2023 in the setting of +JENNIFER/centromere Abs and reported improvement in overall arthralgias as patient was apprehensive of reinitiating disease modifying agents. Patient noted resurfacing of pain in the hands and shoulder and resumed hydroxychloroquine. Prior to 2023, she explains being under the supervision of a prior rheumatologist who had put her on short course of hydroxychloroquine and steroid therapy in 2022. She reports arthralgias having resolved at that time and discontinuing therapy. Patient also seen by rheumatology and orthopedic colleagues at [facility], both teams focusing on physical therapy and viscosupplementation injections for the knees.  She explains history of breast CA in 1995,s/p chemo/radiation with RT sided mastectomy; she had reported in 2018 having taken therapy for lupus though did not recall names of medications. She explains her main complaint at that time was centered around the knee most notable upon prolonged standing or walking ; she reported instability symptoms. She finds climbing stairs to be difficult and notes laxity symptoms can interfere with ADLs markedly. Patient with regular follow-up with cardiology team.  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  She currently denies morning stiffness and further denies visual disturbances, oral ulcers, rash or Raynaud's. She denies systemic symptoms.

## 2025-02-26 NOTE — CONSULT LETTER
[Dear  ___] : Dear  [unfilled], [Consult Letter:] : I had the pleasure of evaluating your patient, [unfilled]. [Please see my note below.] : Please see my note below. [Consult Closing:] : Thank you very much for allowing me to participate in the care of this patient.  If you have any questions, please do not hesitate to contact me. [Sincerely,] : Sincerely, [FreeTextEntry2] : JAMES LEACH

## 2025-02-26 NOTE — DISCUSSION/SUMMARY
[de-identified] : Patient is doing well and making good progress following their s/p Left TKR. I reviewed x-rays with them and compared to prior films with no change from priors. I have reassured them that their implants are functioning well. All questions answered, understanding verbalized. KOOS Jr and PROMIS scores were obtained and reviewed.   Discussed at length the nature of the patients condition. Their right knee symptoms appear secondary to degenerative arthritis. We reviewed operative and nonoperative treatment. While I believe she would eventually need a TKR, she wants to continue non-operatively. Therefore, we will continue nonoperatively. We will schedule for right knee viscosupplementation injections. I also suggested home exercise, weight management, and Voltaren gel. They can continue activities as tolerated.

## 2025-02-26 NOTE — CONSULT LETTER
[Dear  ___] : Dear  [unfilled], [Courtesy Letter:] : I had the pleasure of seeing your patient, [unfilled], in my office today. [Please see my note below.] : Please see my note below. [Consult Closing:] : Thank you very much for allowing me to participate in the care of this patient.  If you have any questions, please do not hesitate to contact me. [Sincerely,] : Sincerely, [FreeTextEntry2] : Abhilash Erazo MD  [FreeTextEntry3] : Lili Fiore M.D., [facility]  of Medicine  [employer] at [facility]

## 2025-02-26 NOTE — REVIEW OF SYSTEMS
[Fever] : no fever [Chills] : no chills [Eye Pain] : no eye pain [Sore Throat] : no sore throat [Hoarseness] : no hoarseness [Chest Pain] : no chest pain [Palpitations] : no palpitations [Cough] : no cough [SOB on Exertion] : no shortness of breath during exertion [Arthralgias] : arthralgias [Joint Stiffness] : joint stiffness [Itching] : itching [Difficulty Walking] : no difficulty walking [Feelings Of Weakness] : no feelings of weakness [Easy Bleeding] : no tendency for easy bleeding [Easy Bruising] : no tendency for easy bruising

## 2025-02-26 NOTE — HISTORY OF PRESENT ILLNESS
[FreeTextEntry1] : Patient explains discontinuing hydroxychloroquine and is off of cs tx.  She explains continued dropping of objects from the RT hand with accompanied paresthesia, now experienced in both hands. She is to see hand colleagues shortly; evaluation reflects RT CTS, Neurology evaluation appreciated. Patient had received low-dose prednisone therapy early 2023 in the setting of +JENNIFER/centromere Abs and reported improvement in overall arthralgias as patient was apprehensive of reinitiating disease modifying agents. Patient noted resurfacing of pain in the hands and shoulder and resumed hydroxychloroquine. Prior to 2023, she explains being under the supervision of a prior rheumatologist who had put her on short course of hydroxychloroquine and steroid therapy in 2022. She reports arthralgias having resolved at that time and discontinuing therapy. Patient also seen by rheumatology and orthopedic colleagues at [facility], both teams focusing on physical therapy and viscosupplementation injections for the knees.  She explains history of breast CA in 1995,s/p chemo/radiation with RT sided mastectomy; she had reported in 2018 having taken therapy for lupus though did not recall names of medications. She explains her main complaint at that time was centered around the knee most notable upon prolonged standing or walking ; she reported instability symptoms. She finds climbing stairs to be difficult and notes laxity symptoms can interfere with ADLs markedly. Patient with regular follow-up with cardiology team.  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  She currently denies morning stiffness and further denies visual disturbances, oral ulcers, rash or Raynaud's. She denies systemic symptoms.

## 2025-02-26 NOTE — ADDENDUM
[FreeTextEntry1] : This note was written by Aj Erickson on 02/26/2025 acting as scribe for Dr. Francisco Titus M.D.   I, Dr. Francisco Titus, have read and attest that all the information, medical decision making and discharge instructions within are true and accurate.

## 2025-02-26 NOTE — ASSESSMENT
[FreeTextEntry1] : Patient with polyarthralgia with shoulder arthropathy improved in the setting of JENNIFER/centromereAbs+; RT CTS:   Photoprotection strongly emphasized in the setting of CTD: recommend yearly cardiology and pulmonary follow-up for echo and CT imaging, respectively; the latter ordered.  PFT's rec.  Discussed the reinitiation of DMARD tx; patient apprehensive of additional therapies . Nephrology requisition given for further evaluation of hematuria.    Patient will benefit from physical therapy to help with joint mobility and muscle strengthening.   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  Vitamin supplementation including Magnesium, Zinc and Calcium have been recommended for CTS;  wrist splint recommended for daily use; OT requisition given.   Quadriceps strengthening exercises encouraged.  Weight loss has been encouraged to reduce load over the medial joint line. Viscosupplementation has been encouraged to provide additional lubrication and joint support. The importance of dietary and lifestyle modifications were emphasized and the importance of weight loss modification in the setting of obesity to help reduce CV risk encouraged.  Food plan and exercise modification discussed in detail.  She will continue on Calcium and VitD at the recommended doses of 1200mg and 800IU daily, respectively, whether through foods or supplements. We reviewed calcium and VitD enriched foods as well. She is in agreement with the above plan will return to the office in three month's time.

## 2025-02-26 NOTE — PHYSICAL EXAM
[General Appearance - Alert] : alert [General Appearance - In No Acute Distress] : in no acute distress [Sclera] : the sclera and conjunctiva were normal [Auscultation Breath Sounds / Voice Sounds] : lungs were clear to auscultation bilaterally [Heart Rate And Rhythm] : heart rate was normal and rhythm regular [Edema] : there was no peripheral edema [No Spinal Tenderness] : no spinal tenderness [Musculoskeletal - Swelling] : no joint swelling seen [] : no rash [Skin Lesions] : no skin lesions [Motor Exam] : the motor exam was normal [FreeTextEntry1] : Phalen+ [Oriented To Time, Place, And Person] : oriented to person, place, and time [Impaired Insight] : insight and judgment were intact

## 2025-02-26 NOTE — HISTORY OF PRESENT ILLNESS
[de-identified] : SHERRI JULIO  68 year old female presents for evaluation of both knees. The left TKR was done about 5 yrs ago doing very well and has no concerns. Her right OA is undergoing non op Tx, had Euflexxa in July of last yr with great improvements. pt states that recently with the cold weather or over long activities, she does feel more pain and wants to repeat the gel injection. She uses Diclofenac gel prn and does walking or exercises.

## 2025-02-26 NOTE — CONSULT LETTER
[Dear  ___] : Dear  [unfilled], [Courtesy Letter:] : I had the pleasure of seeing your patient, [unfilled], in my office today. [Please see my note below.] : Please see my note below. [Consult Closing:] : Thank you very much for allowing me to participate in the care of this patient.  If you have any questions, please do not hesitate to contact me. [Sincerely,] : Sincerely, [FreeTextEntry2] : Abhilash Erazo MD  [FreeTextEntry3] : Lili Fiore M.D., [facility]  of Medicine  [facility] at [facility]

## 2025-03-12 NOTE — ASSESSMENT
[FreeTextEntry1] :  is now 67 years old and returns for a cardiovascular follow up. She carries a medical history as outlined below: -Hx of Hypertension -Hx of Hypothyroidism -Hx of SLE/ Rheumatoid arthritis -Hx of Right sided breast cancer (1996)-> S/P lumpectomy/Radiation/Chemo -Hx of Right sided breast cancer recurrence (2008)-> mastectomy/Radiation/ Chemo (Herceptin) -Hx of Left sided breast cancer - DCIS-(2016)-> S/P lumpectomy -> Tamoxifen X 5 years -Hx of S/P Left Total Knee Replacement-> 2022   #Hypertension   Blood pressure is mildly elevated   Increased Losartan 50 mg -> 75 mg    Continue on HCTZ 50 mg QD    Requested BP monitoring at home  # Hypothyroidism    Reviewed TSH-> September 25, 2024-> .64   Continue on Levothyroxine  88 mcg QD     #Hx of RA   Continuing on Plaquenil 200 mg BID   #History of breast cancer-> s/p Chemo and radiation/ At risk for cardiomyopathy   Echocardiogram performed : November 8, 2023   Echo results:   Normal LV function   Normal diastolic function   No valvular issues   GLS- 21.3%  - Encouraged patient to continue with short intervals of   healthy walking and eating habits, focusing on a Mediterranean style of eating.  - Encouraged the patient to find healthy outlets and coping mechanisms to help manage stress, such as reducing workload if possible, spending time with family and friends, engaging in an enjoyable hobby, or using meditation or mindfulness techniques  I spent 40 minutes evaluating patient's history, family medical history and blood pressure management, ordering tests and providing documentation.

## 2025-03-12 NOTE — HISTORY OF PRESENT ILLNESS
[FreeTextEntry1] : 66  year old  women with: Hyperlipidemia elevated statin  HTN  on medication  Hypothyroidism  SLE  following with Rheum  Dr. Kasper  Rheumatoid arthritis- Dr Pricila Montiel PCP - Dr Micaela Erazo   Hx of  Right breast cancer initially diagnosed in 1996 s/p lumpectomy/XRT /chemotherapy with subsequent recurrence of breast CA noted in 2008 s/p mastectomy/XRT/chemotherapy (Herceptin).  DCIS in her left breast in December of 2016 s/p  lumpectomy on December 21, 2016 with no reported lymph node involvement. Breast surgeon, Dr. Anette Richardson ([facility]).She was started on Tamoxifen x 5 years s/p knee replacement.  Cardiac Testing: Nuclear exercise stress test in July of 2016 which ruled out any inducible ischemia. Echo 2016 Normal LV function. EF 69% Atrial septum aneurysmal, Mild shunting noted across the atrium septum  Interval follow-up February 24, 2021 Complains of positional dizziness Also here for routine cardio oncology follow-up ROS: Denies Chest pain, dyspnea, palpitations or syncope.  Interval follow up 9/3/21 vaccinate against covid compliant with medication/ diet  No recent hospitalization   Scale of 1/10: 5 sharp last 5 mins  also SOB with exertion No radiation to jaw, back arm Associated diaphoresis, palpitations, dizzy Alleviating factors: rest  Exacerbating factors:  activity /stairs etc   Also swelling in right index finger  on prednisone. Plaquenil   ROS: Denies  syncope; No recent hospitalization   Interval follow up 12/1/21 no more CP stress echo normal compliant with medication  tolerating statin   Interval Note September 7, 2022  Patient is now 66 years old that presents for a cardiovascular follow up. She carries a past medical history as outlined below:  -Hx of Hypertension -Hx hyperlipidemia -Hx of Hypothyroidism -Hx of SLE/ Rheumatoid arthritis -Hx of Right sided breast cancer (1996)-> S/P lumpectomy/Radiation/Chemo -Hx of Right sided breast cancer recurrence (2008)-> mastectomy/Radiation/ Chemo (Herceptin) -Hx of Left sided breast cancer - DCIS-(2016)-> S/P lumpectomy -> Tamoxifen X 5 years -Hx of S/P Left Total Knee Replacement-> 2022  Doing well.  PET- + LNs  Bronch Lymph nodes biopsy negative   newly diabetic hbaic 6.8 diet changes bean, rice, bread, sugar  compliant with medication  compliant with diet No recent hospitalization  No new medication   wants a endocrine recommendation  Interval Note May 10, 2023  Ms. Beasley is now 67 years old and returns for follow up Patient recently was seen by rheumatology due to arthralgias in her hands, writs and knees. Given a trigger injection of steroids into her left index finger. She was restarted on Plaquenil and began Prednisone 2.5 mg daily.  Blood pressure today: mildly elevated-> 143/ 84 EKG- Sinus rhythm @ 81 bpm,  stable and unchanged  Reviewed stress echo performed on September 29, 2021 LVEF  69% 7 METS of activity, fair for age and gender Normal stress echo with no evidence of inducible ischemia.   Patient otherwise reports feeling well, denies shortness of breath, chest discomfort or palpitations.   Interval Note November 8, 2023   is now 67 years old and returns for a cardiovascular follow up. She carries a medical history as outlined below: -Hx of Hypertension -Hx of Hypothyroidism -Hx of SLE/ Rheumatoid arthritis -Hx of Right sided breast cancer (1996)-> S/P lumpectomy/Radiation/Chemo -Hx of Right sided breast cancer recurrence (2008)-> mastectomy/Radiation/ Chemo (Herceptin) -Hx of Left sided breast cancer - DCIS-(2016)-> S/P lumpectomy -> Tamoxifen X 5 years -Hx of S/P Left Total Knee Replacement-> 2022  Blood pressure today:  146/ 82-> patient did not take her BP medication today EKG- Sinus rhythm @ 74 bpm   Patient presents today with ongoing complaints of numbness and tingling in her hands. She is being evaluated by neurologist, Dr. Robert Dias Recent Cervical MRI- demonstrated some cervical degeneration which may be contributory to these symptoms. Patient is scheduled to repeat a nerve conduction velocity test and EMG examination of the upper extremities to check progress of her carpal tunnel syndrome.  Interval Note May 8, 2024  Ms. Beasley is now 68 years old and returns for a cardiovascular follow up. She carries a history as outlined below: -Hx of Hypertension -Hx of Hypothyroidism -Hx of SLE/ Rheumatoid arthritis -Hx of Sjogren's -Hx of Right sided breast cancer (1996)-> S/P lumpectomy/Radiation/Chemo -Hx of Right sided breast cancer recurrence (2008)-> mastectomy/Radiation/ Chemo (Herceptin) -Hx of Left sided breast cancer - DCIS-(2016)-> S/P lumpectomy -> Tamoxifen X 5 years -Hx of S/P Left Total Knee Replacement-> 2022  Primary Care Doctor- Dr. Micaela Erazo Rheumatologist:  Dr. Lili Fiore  Blood pressure today:143/86-> repeat measurement: 134/74 EKG- Sinus rhythm, left axis @ 76 bpm   Reviewed recent echocardiogram from November 2023 Normal LV systolic function GLS  -21.3%  (normal) Normal LV diastolic function Normal RV cavity size, wall thickness and systolic function No pericardial effusion Mild AR Stable and unchanged.  Patient reports feeling well, continues to experience some chronic arthralgias-> left shoulder pain Her main complaints are centered around her knees due to laxity symptoms.  Interval Note November 13, 2024  Ms. Beasley returns for a cardiovascular follow up. She carries a history as outlined below:  -Hx of Hypertension -Hx of Hypothyroidism -Hx of SLE/ Rheumatoid arthritis -Hx of Sjogren's -Hx of Right sided breast cancer (1996)-> S/P lumpectomy/Radiation/Chemo -Hx of Right sided breast cancer recurrence (2008)-> mastectomy/Radiation/ Chemo (Herceptin) -Hx of Left sided breast cancer - DCIS-(2016)-> S/P lumpectomy -> Tamoxifen X 5 years -Hx of S/P Left Total Knee Replacement-> 2022  Blood pressure today: 149/82 EKG- Sinus rhythm @ 78 bpm   Patient reports that she is leaving for [facility] on December 1, 2024 and returning on January 18, 2025.  She reports feeling relatively well and offers no complaints of shortness of breath, chest discomfort or palpitations.

## 2025-03-12 NOTE — CARDIOLOGY SUMMARY
[de-identified] : November 13, 2024 Sinus rhythm @ 78 bpm   [de-identified] : November 8, 2023 Normal LV function LVEF  [Normal] : normal [___] : [unfilled] [No Ischemia] : no Ischemia [LVEF ___%] : LVEF [unfilled]%

## 2025-03-26 NOTE — PROCEDURE
[de-identified] : Euflexxa # 1 Right Knee Discussed at length with the patient the planned Euflexxa injection. The risks, benefits, convalescence and alternatives were reviewed. The possible side effects discussed included but were not limited to: pain, swelling, heat and redness. There symptoms are generally mild but if they are extensive then contact the office. Giving pain relievers by mouth such as NSAIDs or Tylenol can generally treat the reactions to Euflexxa. Rare cases of infection have been noted. Rash, hives and itching may occur post injection. If you have muscle pain or cramps, flushing and or swelling of the face, rapid heart beat, nausea, dizziness, fever, chills, headache, difficulty breathing, swelling in the arms or legs, or have a prickly feeling of your skin, contact a health care provider immediately.  Following this discussion, the knee was prepped with betadine and under sterile condition the Euflexxa injection was performed with a 22 gauge needle. The needle was introduced into the joint, aspiration was performed to ensure intra-articular placement and the medication was injected. Upon withdrawal of the needle the site was cleaned with alcohol and a bandaid applied. The patient tolerated the injection well and there were no adverse effects. Post injection instructions included no strenuous activity for 24 hours, cryotherapy and if there are any adverse effects to contact the office.

## 2025-04-02 NOTE — PROCEDURE
[de-identified] : Euflexxa # 2 Right Knee Discussed at length with the patient the planned Euflexxa injection. The risks, benefits, convalescence and alternatives were reviewed. The possible side effects discussed included but were not limited to: pain, swelling, heat and redness. There symptoms are generally mild but if they are extensive then contact the office. Giving pain relievers by mouth such as NSAIDs or Tylenol can generally treat the reactions to Euflexxa. Rare cases of infection have been noted. Rash, hives and itching may occur post injection. If you have muscle pain or cramps, flushing and or swelling of the face, rapid heart beat, nausea, dizziness, fever, chills, headache, difficulty breathing, swelling in the arms or legs, or have a prickly feeling of your skin, contact a health care provider immediately.  Following this discussion, the knee was prepped with betadine and under sterile condition the Euflexxa injection was performed with a 22 gauge needle. The needle was introduced into the joint, aspiration was performed to ensure intra-articular placement and the medication was injected. Upon withdrawal of the needle the site was cleaned with alcohol and a bandaid applied. The patient tolerated the injection well and there were no adverse effects. Post injection instructions included no strenuous activity for 24 hours, cryotherapy and if there are any adverse effects to contact the office.

## 2025-04-03 NOTE — ASSESSMENT
[FreeTextEntry1] : 70 yo F with LUTS, microhematuria  - Reviewed labwork done by PCP and confirmed microhematuria on UA 2/24/25 - Discussed possible etiologies for microhematuria including benign (UTI, nephrolithiasis, cyst, BPH) vs malignancy (renal, ureteral and bladder). Discussed hematuria workup which includes upper tract imaging such as US or CT urogram and cystoscopy. Discussed risk and benefits of cystoscopy. - CT urogram, cysto - Urine culture - Discussed possible etiologies for LUTS. Discussed ways to manage them including behavioral modifications such as adequate hydration, controlling constipation, restricting fluids in the evening - Not interested in secondline or thirdline therapies at this time

## 2025-04-03 NOTE — HISTORY OF PRESENT ILLNESS
[FreeTextEntry1] : 61 yo F previously seen for nocturia. Doing better since instituting behavioral changes. Referred for microhematuria. Denies any other symptoms including gross hematuria, flank pain. UA does show small blood but no microscopic exam since Nov 2017 which was negative for RBCs.  10/24/19 Interval history; urge incontinence = when she goes from sitting to standing using pantiliner = not everyday weak stream no dysuria, no gross hematuria Drinks 4-5 large bottles of water due to chronic dry mouth, 1 cup of coffee nocturia 4/night and only voids 4 times per day normal bowel movements chronic dry mouth  10/22/20 Interval history: Referred for microhematuria, no gross hematuria voiding every 30 minutes, nocturia 3/night Drinks 4-5 liters of water, 1 cup of coffee urge incontinence - pantiliner changed 2/day normal bowel movements  3/24/25 Interval history: Hasn't been seen since 2020 Referred for microhematuria no gross hematuria, no dysuria LUTS still - Voiding every 45 minutes, nocturia 3/night urge incontinence - pantiliner changed 2/day Drinks 7 glasses of water, 2 cups of coffee normal bowel movements last saw gyn 5 yrs ago LMP = hysterectomy 30 yrs ago for fibroids

## 2025-04-03 NOTE — PHYSICAL EXAM
[Normal Appearance] : normal appearance [Well Groomed] : well groomed [General Appearance - In No Acute Distress] : no acute distress [Edema] : no peripheral edema [] : no respiratory distress [Respiration, Rhythm And Depth] : normal respiratory rhythm and effort [Exaggerated Use Of Accessory Muscles For Inspiration] : no accessory muscle use [No Focal Deficits] : no focal deficits [Oriented To Time, Place, And Person] : oriented to person, place, and time [Affect] : the affect was normal [Mood] : the mood was normal

## 2025-04-03 NOTE — HISTORY OF PRESENT ILLNESS
[FreeTextEntry1] : 63 yo F previously seen for nocturia. Doing better since instituting behavioral changes. Referred for microhematuria. Denies any other symptoms including gross hematuria, flank pain. UA does show small blood but no microscopic exam since Nov 2017 which was negative for RBCs.  10/24/19 Interval history; urge incontinence = when she goes from sitting to standing using pantiliner = not everyday weak stream no dysuria, no gross hematuria Drinks 4-5 large bottles of water due to chronic dry mouth, 1 cup of coffee nocturia 4/night and only voids 4 times per day normal bowel movements chronic dry mouth  10/22/20 Interval history: Referred for microhematuria, no gross hematuria voiding every 30 minutes, nocturia 3/night Drinks 4-5 liters of water, 1 cup of coffee urge incontinence - pantiliner changed 2/day normal bowel movements  3/24/25 Interval history: Hasn't been seen since 2020 Referred for microhematuria no gross hematuria, no dysuria LUTS still - Voiding every 45 minutes, nocturia 3/night urge incontinence - pantiliner changed 2/day Drinks 7 glasses of water, 2 cups of coffee normal bowel movements last saw gyn 5 yrs ago LMP = hysterectomy 30 yrs ago for fibroids

## 2025-04-03 NOTE — ASSESSMENT
[FreeTextEntry1] : 68 yo F with LUTS, microhematuria  - Reviewed labwork done by PCP and confirmed microhematuria on UA 2/24/25 - Discussed possible etiologies for microhematuria including benign (UTI, nephrolithiasis, cyst, BPH) vs malignancy (renal, ureteral and bladder). Discussed hematuria workup which includes upper tract imaging such as US or CT urogram and cystoscopy. Discussed risk and benefits of cystoscopy. - CT urogram, cysto - Urine culture - Discussed possible etiologies for LUTS. Discussed ways to manage them including behavioral modifications such as adequate hydration, controlling constipation, restricting fluids in the evening - Not interested in secondline or thirdline therapies at this time

## 2025-04-03 NOTE — PHYSICAL EXAM
[Normal Appearance] : normal appearance [Well Groomed] : well groomed [General Appearance - In No Acute Distress] : no acute distress [] : no respiratory distress [Edema] : no peripheral edema [Respiration, Rhythm And Depth] : normal respiratory rhythm and effort [Exaggerated Use Of Accessory Muscles For Inspiration] : no accessory muscle use [No Focal Deficits] : no focal deficits [Oriented To Time, Place, And Person] : oriented to person, place, and time [Affect] : the affect was normal [Mood] : the mood was normal

## 2025-04-09 NOTE — PROCEDURE
[de-identified] : Euflexxa # 3 Right Knee Discussed at length with the patient the planned Euflexxa injection. The risks, benefits, convalescence and alternatives were reviewed. The possible side effects discussed included but were not limited to: pain, swelling, heat and redness. There symptoms are generally mild but if they are extensive then contact the office. Giving pain relievers by mouth such as NSAIDs or Tylenol can generally treat the reactions to Euflexxa. Rare cases of infection have been noted. Rash, hives and itching may occur post injection. If you have muscle pain or cramps, flushing and or swelling of the face, rapid heart beat, nausea, dizziness, fever, chills, headache, difficulty breathing, swelling in the arms or legs, or have a prickly feeling of your skin, contact a health care provider immediately.  Following this discussion, the knee was prepped with betadine and under sterile condition the Euflexxa injection was performed with a 22 gauge needle. The needle was introduced into the joint, aspiration was performed to ensure intra-articular placement and the medication was injected. Upon withdrawal of the needle the site was cleaned with alcohol and a bandaid applied. The patient tolerated the injection well and there were no adverse effects. Post injection instructions included no strenuous activity for 24 hours, cryotherapy and if there are any adverse effects to contact the office.

## 2025-04-09 NOTE — PROCEDURE
[de-identified] : Euflexxa # 3 Right Knee Discussed at length with the patient the planned Euflexxa injection. The risks, benefits, convalescence and alternatives were reviewed. The possible side effects discussed included but were not limited to: pain, swelling, heat and redness. There symptoms are generally mild but if they are extensive then contact the office. Giving pain relievers by mouth such as NSAIDs or Tylenol can generally treat the reactions to Euflexxa. Rare cases of infection have been noted. Rash, hives and itching may occur post injection. If you have muscle pain or cramps, flushing and or swelling of the face, rapid heart beat, nausea, dizziness, fever, chills, headache, difficulty breathing, swelling in the arms or legs, or have a prickly feeling of your skin, contact a health care provider immediately.  Following this discussion, the knee was prepped with betadine and under sterile condition the Euflexxa injection was performed with a 22 gauge needle. The needle was introduced into the joint, aspiration was performed to ensure intra-articular placement and the medication was injected. Upon withdrawal of the needle the site was cleaned with alcohol and a bandaid applied. The patient tolerated the injection well and there were no adverse effects. Post injection instructions included no strenuous activity for 24 hours, cryotherapy and if there are any adverse effects to contact the office.

## 2025-04-21 NOTE — COUNSELING
[Weight management counseling provided] : Weight management [Healthy eating counseling provided] : healthy eating [Activity counseling provided] : activity [Target Wt Loss Goal ___] : Target weight loss goal [unfilled] lbs [Weigh Self Once Weekly] : Weigh self once weekly [Low Fat Diet] : Low fat diet [Low Salt Diet] : Low salt diet [Decrease Portions] : Decrease food portions [Keep Food Diary] : Keep food diary [___ min/wk activity recommended] : [unfilled] min/wk activity recommended [Walking] : Walking [Sleep ___ hours/day] : Sleep [unfilled] hours/day [Engage in a relaxing activity] : Engage in a relaxing activity [Plan in advance] : Plan in advance [None] : None

## 2025-04-21 NOTE — END OF VISIT
OB [Time Spent: ___ minutes] : I have spent [unfilled] minutes of time on the encounter which excludes teaching and separately reported services.

## 2025-04-22 NOTE — HISTORY OF PRESENT ILLNESS
[No Pertinent Cardiac History] : no history of aortic stenosis, atrial fibrillation, coronary artery disease, recent myocardial infarction, or implantable device/pacemaker [No Pertinent Pulmonary History] : no history of asthma, COPD, sleep apnea, or smoking [No Adverse Anesthesia Reaction] : no adverse anesthesia reaction in self or family member [(Patient denies any chest pain, claudication, dyspnea on exertion, orthopnea, palpitations or syncope)] : Patient denies any chest pain, claudication, dyspnea on exertion, orthopnea, palpitations or syncope [Family Member] : no family member with adverse anesthesia reaction/sudden death [Self] : no previous adverse anesthesia reaction [Chronic Anticoagulation] : no chronic anticoagulation [Chronic Kidney Disease] : no chronic kidney disease [Diabetes] : no diabetes [FreeTextEntry1] : colonoscopy [FreeTextEntry2] : 4/25/25 [FreeTextEntry3] : [facility] [FreeTextEntry4] : Pt is a 69 yr old woman  with  cts, arthralgia, auto immune disease , gerd hep c , hx of chemotherapy, hld, hypothyroid, prediabetes, breast cancer sicca Sjogren's, sleep disturbance , hpn, and gerd who is here for medical clearance for  colon cancer screening.  Her last colonoscopy was 9/13/19 diverticuli and hemorrhoids.   pt has constipation and has rectal bleeding and ahs pain in her rectum.    She -denies any headaches, nausea, vomiting, fever, chills, sweats, chest pain, chest pressure, diarrhea, constipation, dysphagia, sour taste in the mouth, dizziness, leg swelling, leg pain, myalgias, arthralgias, itchy eyes, itchy ears, heartburn, or reflux.   [FreeTextEntry7] : CT HEART CALCIUM SCORE  - ORDERED BY: AYLEEN LUNA   PROCEDURE DATE:  2025    INTERPRETATION:  Indication: 69-year-old woman with reported coronary artery disease referred for evaluation of coronary artery calcium score.  Procedure: Noncontrast ECG-gated computed tomography of the heart was performed without complications. Axial two-dimensional images were analyzed using the [employer] Workstation. Study quality is good.  FINDINGS:  Agatston Score: LM: 43 LAD: 51 LCx: 0 RCA: 3 Total: 97  Calcium Volume: Left main: 34 Left anterior descendin Left circumflex: 0 Right coronary: 6  Non-Coronary: Heart size qualitatively appears within normal limits.  Trace pericardial fluid is noted.  Aortic valve and aortic root calcifications are noted.  Calcified plaque is noted in the visualized portions of the aortic arch and descending aorta.  Partially included postradiation scarring in the right lung. Right mastectomy. Surgical clips in the left breast.  IMPRESSION: 1. Coronary Artery Calcium Score = 97 Agatston Units.  This value is between the 50th and 75th percentile for females between the ages of 65 and 69 ([facility] database).

## 2025-04-22 NOTE — HISTORY OF PRESENT ILLNESS
[No Pertinent Cardiac History] : no history of aortic stenosis, atrial fibrillation, coronary artery disease, recent myocardial infarction, or implantable device/pacemaker [No Pertinent Pulmonary History] : no history of asthma, COPD, sleep apnea, or smoking [No Adverse Anesthesia Reaction] : no adverse anesthesia reaction in self or family member [(Patient denies any chest pain, claudication, dyspnea on exertion, orthopnea, palpitations or syncope)] : Patient denies any chest pain, claudication, dyspnea on exertion, orthopnea, palpitations or syncope [Family Member] : no family member with adverse anesthesia reaction/sudden death [Self] : no previous adverse anesthesia reaction [Chronic Anticoagulation] : no chronic anticoagulation [Chronic Kidney Disease] : no chronic kidney disease [Diabetes] : no diabetes [FreeTextEntry1] : colonoscopy [FreeTextEntry2] : 4/25/25 [FreeTextEntry3] : [facility] [FreeTextEntry4] : Pt is a 69 yr old woman  with  cts, arthralgia, auto immune disease , gerd hep c , hx of chemotherapy, hld, hypothyroid, prediabetes, breast cancer sicca Sjogren's, sleep disturbance , hpn, and gerd who is here for medical clearance for  colon cancer screening.  Her last colonoscopy was 9/13/19 diverticuli and hemorrhoids.   pt has constipation and has rectal bleeding and ahs pain in her rectum.    She -denies any headaches, nausea, vomiting, fever, chills, sweats, chest pain, chest pressure, diarrhea, constipation, dysphagia, sour taste in the mouth, dizziness, leg swelling, leg pain, myalgias, arthralgias, itchy eyes, itchy ears, heartburn, or reflux.   [FreeTextEntry7] : CT HEART CALCIUM SCORE  - ORDERED BY: AYLEEN LUNA   PROCEDURE DATE:  2025    INTERPRETATION:  Indication: 69-year-old woman with reported coronary artery disease referred for evaluation of coronary artery calcium score.  Procedure: Noncontrast ECG-gated computed tomography of the heart was performed without complications. Axial two-dimensional images were analyzed using the [employer] Workstation. Study quality is good.  FINDINGS:  Agatston Score: LM: 43 LAD: 51 LCx: 0 RCA: 3 Total: 97  Calcium Volume: Left main: 34 Left anterior descendin Left circumflex: 0 Right coronary: 6  Non-Coronary: Heart size qualitatively appears within normal limits.  Trace pericardial fluid is noted.  Aortic valve and aortic root calcifications are noted.  Calcified plaque is noted in the visualized portions of the aortic arch and descending aorta.  Partially included postradiation scarring in the right lung. Right mastectomy. Surgical clips in the left breast.  IMPRESSION: 1. Coronary Artery Calcium Score = 97 Agatston Units.  This value is between the 50th and 75th percentile for females between the ages of 65 and 69 ([facility] database).

## 2025-04-22 NOTE — PLAN
[FreeTextEntry1] : hpn  well controlled   DIETARY SALT (SODIUM); DASH DIET AND BLOOD PRESSURE: To decrease the sodium in your diet:   Use fresh vegetables and foods as much as possible.  Avoid canned and processed foods. Cured meats such as quick, ham, and sausages are high in salt.  Try using different herbs and spices in your cooking instead of salt.  In restaurants, avoid foods with sauces, cheese, and cured meats. Ask for low-sodium choices. To get more potassium in your diet, eat:  Bananas, fresh or dried apricots, peaches, citrus fruits, melons  Cauliflower, broccoli, tomatoes, carrots, raw spinach, beet greens, potatoes To get more magnesium in your diet, eat:  Whole grain foods, leafy green vegetables, dried fruits  Fish and seafood, poultry  To get more calcium in your diet, eat:  Nonfat milk, yogurt, and low-fat cheeses   [place] and sardines  Cooked dried beans  Broccoli, kale, and bok manny  Tofu or soybean curd DASH stands for "dietary approaches to stop hypertension." The DASH diet is low in total and saturated fat. It is rich in fruits, vegetables, and low-fat dairy foods. The diet allows you to get natural fiber, calcium, and magnesium from food. It prevents or lowers high blood pressure. It can also help lower cholesterol in your blood.  Don't change how you eat all at once. It's much more likely that you'll succeed if you make only one or two small changes at a time. Wait until those changes are a habit, then make a couple more changes. Some good starting steps include:  Add one serving of vegetables to your meals at lunch and dinner. This is an easy way to help you get more vegetables in your diet.  Have a piece of fruit as an afternoon or after-dinner snack. One glass of juice at breakfast is not enough fruit in your diet.  Use half your usual amount of butter, margarine, or salad dressing.  Buy nonfat salad dressing or nonfat sour cream. Follow this guide to select your menu of meals. The number of calories we want you to eat each day will tell you how many servings you can choose from each food group. Calories: 1600 2100 2600 3100 Servings Grains 6 7  10  12  Vegetables 	 4 4  5 6 Fruit 4 5 5 6 Dairy (low-fat) 2  3 3 3  Meat, poultry, fish  1  2 2  Nuts, seeds    1 Fats and sweets 1  2  3 4 Grains and grain products like breads and cereals provide energy, fiber and vitamins. Whole grains have more of these nutrients. One serving equals one of the following: Bagel, 1/2 medium; Barley, cooked 1/2 cup; Biscuit, country style 1 medium; Bread, whole wheat, white 1 slice; Cereals, cold or cooked, 1/2 cup; Cornbread, 1 medium piece; Crackers, brittani, 2; Crackers, saltine, 4; Dinner roll, 1medium; English muffin, ; Hamburger bun, ; Muffin, 1 medium; Pancake, 1 medium; Pasta, 1/2 cup cooked; Chrissy, 1/2 large or 1 small; Popcorn, 1 cup popped; Pretzels, 1 ounce; Rice, white, brown, or wild, 1/2 cup cooked; Tortillas, corn or flour, 1 medium; Waffle, 1 medium; Wheat germ, 1/4 cup;  Vegetables are rich sources of potassium, magnesium, and fiber. One serving is 1/2 cup of any of the following cooked vegetables: Asparagus, Beans (green, yellow), Beets, Broccoli, [place] Sprouts, Carrots, Cauliflower, Brice, chicory, mustard and turnip (and other) greens, Corn, Kale, Lima beans, Mixed vegetables, Okra, Parsnips, Peas, green, Potatoes (1/2 medium or 1/2 cup mashed), Pumpkin, Rutabaga, Spaghetti or tomato sauce, Spinach, Squash (zucchini or yellow), Stewed tomatoes, Succotash, Sweet potatoes, Turnips, Yam  Raw vegetables: Carrots,1/2 cup; Celery, 1/2 cup; Lettuce (porter, loose-leaf, green-leaf), 1 cup; Peppers, 1/2 cup; Spinach, 1 cup; Tomato, 1/2 Fruits and fruit juices are important sources of potassium and magnesium. Fruits also contain fiber and are low in sodium and fat. One serving equals: Any fruit juice, # cup (6 ounces); Canned or frozen fruit,  cup (includes applesauce); Dried fruit,  cup;  Fresh fruit: Apple, 1 medium; Apricots, 2 medium; Banana, 1 medium; Berries, 1/2 cup; Melon, 1 wedge, or 1/2 cup; Cherries, 10; Grapefruit, 1/2; Grapes, 15; Kiwi, 1 medium; [place], 1/2 small; Nectarine, 1 medium; Orange, 1 medium; Peach, 1 medium; Pear, 1 medium; Pineapple, 1/2 cup; Plums, 2 medium; Tangerine, 1 large Dairy foods provide protein and calcium. Use low-fat or nonfat dairy products to cut down on fat. One serving equals: Skim milk, 1 cup (8 ounces); 1% low fat milk, 1 cup (8 ounces); 2% low fat milk, 1 cup (8 ounces) nonfat dry milk powder (1/3 cup); Low-fat cottage cheese, 1 cup (8 ounces); Parmesan cheese, 1 tablespoon; Mozzarella cheese, part skim, 1/4 cup (1 ounce); Low-fat cheddar cheese, 11/2 ounces; Ricotta cheese, part skim milk or nonfat, 1/4 cup (11/2 ounces); Other low fat or nonfat cheeses (11/2 oz.); Low-fat or nonfat yogurt, fruit-flavored or plain, 1 cup (8 ounces) Low-fat or nonfat frozen yogurt, 1/2 cup (4 ounces); Note: People who can't digest dairy products can try taking lactase enzyme pills or drops (available at drug and grocery stores) when they eat dairy. There is also milk available with the enzyme already added. Or you can buy lactose-free milk. Meat, poultry, and fish are good sources of protein and magnesium. One serving equals: Lean meat including beef, veal, or pork, 3 ounces cooked; Skinless, white meat poultry including turkey, chicken, 3 ounces; Fish and shellfish, 3 ounces cooked; Low-fat luncheon meats, 1 ounce; Egg, 1 medium; Tofu, 3 ounces Note: Three ounces of cooked meat is about the size of a deck of cards. Nuts, seeds, and legumes are rich sources of energy, magnesium, potassium, protein and fiber. Nuts and seeds are also high in fat, so portions should be small. Almonds, 1/3 cup; Beans such as kidney, rios, and navy, 1/2 cup cooked; Chickpeas and lentils, 1/2 cup cooked; Cashews, 1/3 cup; Filberts, 1/3 cup; Mixed nuts, 1/3 cup; Peanut butter, 2 tablespoons; Peanuts, 1/3 cup; Sesame seeds, 2 tablespoons; Sunflower seeds, 2 tablespoons; Tofu, regular, 3 ounces; Walnuts, 1/3 cup  Following the above diet will give you about 27% fat in your diet. The goal is to have 30% or less of the calories you eat each day be from fat. To meet that goal, do not eat more than 2-3 servings daily of added fat. Also try to limit sweets. One serving equals: Butter or margarine, 1 teaspoon; Mayonnaise, 1 teaspoon; Low-fat mayonnaise, 1 tablespoon; Salad dressing, 1 tablespoon; Low-fat salad dressing, 2 tablespoons; Oil, 1 teaspoon (use olive, canola, safflower, or other vegetable oils); Candy, hard, 3 pieces; Jelly or jam, 1 tablespoon); Jell-O, 1/2 cup; Jelly beans, 1/2 ounce; Maple syrup, 1 tablespoon; Popsicle, 1; Sherbet or nonfat or low-fat frozen yogurt, 1/2 cup; Sugar, 1 tablespoon; Sugared lemonade or fruit punch, 1 cup (8 ounces); Note: Try diet fruit-flavored gelatin or frozen, canned, or fresh fruit for dessert.  Small amounts of alcohol may have benefits to the heart and blood pressure. However, excess use of alcohol can cause damage to the brain, liver and other organs. It can lead to high blood pressure. Drinking more than two drinks (15 ml) every day can raise your blood pressure. 15 ml of alcohol equals:   one 12-ounce bottle of beer   a half glass (5 ounces) of wine   1 ounce (one shot) of 100 proof hard liquor

## 2025-04-22 NOTE — PLAN
[FreeTextEntry1] : hpn  well controlled   DIETARY SALT (SODIUM); DASH DIET AND BLOOD PRESSURE: To decrease the sodium in your diet:   Use fresh vegetables and foods as much as possible.  Avoid canned and processed foods. Cured meats such as quick, ham, and sausages are high in salt.  Try using different herbs and spices in your cooking instead of salt.  In restaurants, avoid foods with sauces, cheese, and cured meats. Ask for low-sodium choices. To get more potassium in your diet, eat:  Bananas, fresh or dried apricots, peaches, citrus fruits, melons  Cauliflower, broccoli, tomatoes, carrots, raw spinach, beet greens, potatoes To get more magnesium in your diet, eat:  Whole grain foods, leafy green vegetables, dried fruits  Fish and seafood, poultry  To get more calcium in your diet, eat:  Nonfat milk, yogurt, and low-fat cheeses   [place] and sardines  Cooked dried beans  Broccoli, kale, and bok manny  Tofu or soybean curd DASH stands for "dietary approaches to stop hypertension." The DASH diet is low in total and saturated fat. It is rich in fruits, vegetables, and low-fat dairy foods. The diet allows you to get natural fiber, calcium, and magnesium from food. It prevents or lowers high blood pressure. It can also help lower cholesterol in your blood.  Don't change how you eat all at once. It's much more likely that you'll succeed if you make only one or two small changes at a time. Wait until those changes are a habit, then make a couple more changes. Some good starting steps include:  Add one serving of vegetables to your meals at lunch and dinner. This is an easy way to help you get more vegetables in your diet.  Have a piece of fruit as an afternoon or after-dinner snack. One glass of juice at breakfast is not enough fruit in your diet.  Use half your usual amount of butter, margarine, or salad dressing.  Buy nonfat salad dressing or nonfat sour cream. Follow this guide to select your menu of meals. The number of calories we want you to eat each day will tell you how many servings you can choose from each food group. Calories: 1600 2100 2600 3100 Servings Grains 6 7  10  12  Vegetables 	 4 4  5 6 Fruit 4 5 5 6 Dairy (low-fat) 2  3 3 3  Meat, poultry, fish  1  2 2  Nuts, seeds    1 Fats and sweets 1  2  3 4 Grains and grain products like breads and cereals provide energy, fiber and vitamins. Whole grains have more of these nutrients. One serving equals one of the following: Bagel, 1/2 medium; Barley, cooked 1/2 cup; Biscuit, country style 1 medium; Bread, whole wheat, white 1 slice; Cereals, cold or cooked, 1/2 cup; Cornbread, 1 medium piece; Crackers, brittani, 2; Crackers, saltine, 4; Dinner roll, 1medium; English muffin, ; Hamburger bun, ; Muffin, 1 medium; Pancake, 1 medium; Pasta, 1/2 cup cooked; Chrissy, 1/2 large or 1 small; Popcorn, 1 cup popped; Pretzels, 1 ounce; Rice, white, brown, or wild, 1/2 cup cooked; Tortillas, corn or flour, 1 medium; Waffle, 1 medium; Wheat germ, 1/4 cup;  Vegetables are rich sources of potassium, magnesium, and fiber. One serving is 1/2 cup of any of the following cooked vegetables: Asparagus, Beans (green, yellow), Beets, Broccoli, [place] Sprouts, Carrots, Cauliflower, Brice, chicory, mustard and turnip (and other) greens, Corn, Kale, Lima beans, Mixed vegetables, Okra, Parsnips, Peas, green, Potatoes (1/2 medium or 1/2 cup mashed), Pumpkin, Rutabaga, Spaghetti or tomato sauce, Spinach, Squash (zucchini or yellow), Stewed tomatoes, Succotash, Sweet potatoes, Turnips, Yam  Raw vegetables: Carrots,1/2 cup; Celery, 1/2 cup; Lettuce (porter, loose-leaf, green-leaf), 1 cup; Peppers, 1/2 cup; Spinach, 1 cup; Tomato, 1/2 Fruits and fruit juices are important sources of potassium and magnesium. Fruits also contain fiber and are low in sodium and fat. One serving equals: Any fruit juice, # cup (6 ounces); Canned or frozen fruit,  cup (includes applesauce); Dried fruit,  cup;  Fresh fruit: Apple, 1 medium; Apricots, 2 medium; Banana, 1 medium; Berries, 1/2 cup; Melon, 1 wedge, or 1/2 cup; Cherries, 10; Grapefruit, 1/2; Grapes, 15; Kiwi, 1 medium; [place], 1/2 small; Nectarine, 1 medium; Orange, 1 medium; Peach, 1 medium; Pear, 1 medium; Pineapple, 1/2 cup; Plums, 2 medium; Tangerine, 1 large Dairy foods provide protein and calcium. Use low-fat or nonfat dairy products to cut down on fat. One serving equals: Skim milk, 1 cup (8 ounces); 1% low fat milk, 1 cup (8 ounces); 2% low fat milk, 1 cup (8 ounces) nonfat dry milk powder (1/3 cup); Low-fat cottage cheese, 1 cup (8 ounces); Parmesan cheese, 1 tablespoon; Mozzarella cheese, part skim, 1/4 cup (1 ounce); Low-fat cheddar cheese, 11/2 ounces; Ricotta cheese, part skim milk or nonfat, 1/4 cup (11/2 ounces); Other low fat or nonfat cheeses (11/2 oz.); Low-fat or nonfat yogurt, fruit-flavored or plain, 1 cup (8 ounces) Low-fat or nonfat frozen yogurt, 1/2 cup (4 ounces); Note: People who can't digest dairy products can try taking lactase enzyme pills or drops (available at drug and grocery stores) when they eat dairy. There is also milk available with the enzyme already added. Or you can buy lactose-free milk. Meat, poultry, and fish are good sources of protein and magnesium. One serving equals: Lean meat including beef, veal, or pork, 3 ounces cooked; Skinless, white meat poultry including turkey, chicken, 3 ounces; Fish and shellfish, 3 ounces cooked; Low-fat luncheon meats, 1 ounce; Egg, 1 medium; Tofu, 3 ounces Note: Three ounces of cooked meat is about the size of a deck of cards. Nuts, seeds, and legumes are rich sources of energy, magnesium, potassium, protein and fiber. Nuts and seeds are also high in fat, so portions should be small. Almonds, 1/3 cup; Beans such as kidney, rios, and navy, 1/2 cup cooked; Chickpeas and lentils, 1/2 cup cooked; Cashews, 1/3 cup; Filberts, 1/3 cup; Mixed nuts, 1/3 cup; Peanut butter, 2 tablespoons; Peanuts, 1/3 cup; Sesame seeds, 2 tablespoons; Sunflower seeds, 2 tablespoons; Tofu, regular, 3 ounces; Walnuts, 1/3 cup  Following the above diet will give you about 27% fat in your diet. The goal is to have 30% or less of the calories you eat each day be from fat. To meet that goal, do not eat more than 2-3 servings daily of added fat. Also try to limit sweets. One serving equals: Butter or margarine, 1 teaspoon; Mayonnaise, 1 teaspoon; Low-fat mayonnaise, 1 tablespoon; Salad dressing, 1 tablespoon; Low-fat salad dressing, 2 tablespoons; Oil, 1 teaspoon (use olive, canola, safflower, or other vegetable oils); Candy, hard, 3 pieces; Jelly or jam, 1 tablespoon); Jell-O, 1/2 cup; Jelly beans, 1/2 ounce; Maple syrup, 1 tablespoon; Popsicle, 1; Sherbet or nonfat or low-fat frozen yogurt, 1/2 cup; Sugar, 1 tablespoon; Sugared lemonade or fruit punch, 1 cup (8 ounces); Note: Try diet fruit-flavored gelatin or frozen, canned, or fresh fruit for dessert.  Small amounts of alcohol may have benefits to the heart and blood pressure. However, excess use of alcohol can cause damage to the brain, liver and other organs. It can lead to high blood pressure. Drinking more than two drinks (15 ml) every day can raise your blood pressure. 15 ml of alcohol equals:   one 12-ounce bottle of beer   a half glass (5 ounces) of wine   1 ounce (one shot) of 100 proof hard liquor

## 2025-04-22 NOTE — PHYSICAL EXAM
[No Acute Distress] : no acute distress [Well-Appearing] : well-appearing [Normal Oropharynx] : the oropharynx was normal [No JVD] : no jugular venous distention [Supple] : supple [Rate ___] : at [unfilled] breaths per minute [Normal Rhythm/Effort] : normal respiratory rhythm and effort [Clear Bilaterally] : the lungs were clear to auscultation bilaterally [Normal to Percussion] : the lungs were normal to percussion [Heart Rate ___] : [unfilled] bpm [Normal Rate] : normal [Rhythm Regular] : regular [Normal S1] : normal S1 [Normal S2] : normal S2 [No Murmur] : no murmurs heard [No Pitting Edema] : no pitting edema present [2+] : left 2+ [No Abnormalities] : the abdominal aorta was not enlarged and no bruit was heard [Soft, Nontender] : the abdomen was soft and nontender [No Mass] : no masses were palpated [No HSM] : no hepatosplenomegaly noted [Normal Posterior Cervical Nodes] : no posterior cervical lymphadenopathy [Normal Anterior Cervical Nodes] : no anterior cervical lymphadenopathy [No CVA Tenderness] : no CVA  tenderness [No Spinal Tenderness] : no spinal tenderness [Normal Station and Gait] : the gait and station were normal [Normal Motor Tone] : the muscle tone was normal [Involuntary Movements] : no involuntary movements were seen [Normal Scalp] : inspection of the scalp showed no abnormalities [Examination Of The Hair] : texture and distribution of hair was normal [Normal] : affect was normal and insight and judgment were intact [S3] : no S3 [S4] : no S4 [Rt] : no varicose veins of the right leg [Lt] : no varicose veins of the left leg [Right Carotid Bruit] : no bruit heard over the right carotid [Left Carotid Bruit] : no bruit heard over the left carotid [Right Femoral Bruit] : no bruit heard over the right femoral artery [Left Femoral Bruit] : no bruit heard over the left femoral artery [Bruit] : no bruit heard [Excoriation] : no perianal excoriation [Multiple Sinus Tracts] : no perianal sinus tracts [Internal Hemorrhoid] : no internal hemorrhoids were present [Tender, Swollen] : nontender, non-swollen [External Hemorrhoid] : no external hemorrhoids were present [Abnormal Color] : normal color and pigmentation [Skin Lesions 1] : no skin lesions were observed [Skin Turgor Decreased] : normal skin turgor

## 2025-04-22 NOTE — ASSESSMENT
[High Risk Surgery - Intraperitoneal, Intrathoracic or Supringuinal Vascular Procedures] : High Risk Surgery - Intraperitoneal, Intrathoracic or Supringuinal Vascular Procedures - No (0) [Ischemic Heart Disease] : Ischemic Heart Disease - No (0) [Congestive Heart Failure] : Congestive Heart Failure - No (0) [Prior Cerebrovascular Accident or TIA] : Prior Cerebrovascular Accident or TIA - No (0) [Creatinine >= 2mg/dL (1 Point)] : Creatinine >= 2mg/dL - No (0) [Insulin-dependent Diabetic (1 Point)] : Insulin-dependent Diabetic - No (0) [0] : 0 , RCRI Class: I, Risk of Post-Op Cardiac Complications: 3.9%, 95% CI for Risk Estimate: 2.8% - 5.4% [Modify medications prior to procedure] : Modify medications prior to procedure [As per surgery] : as per surgery [FreeTextEntry4] : Pt is having a low risk procedure and is low risks for cardiac adverse outcome and cri is 0.4% .  she was explained the procedures colonoscopy, anesthesia   risks and complications alternatives , prep and post procedure care.  I have answered all her questions.  she will have blood testing today  . The risks, benefits, and alternatives were explained in detail to the patient. Risks including but not limited to bleeding, perforation, adverse reaction to medications, cardiopulmonary compromise and their attendant sequalae explained. Sequelae including but not limited to need for surgery, colostomy, prolonged hospital stay, placement of drainage tubes, blood transfusions, disability, morbidity and mortality was explained.  It has been made clear to the patient that although colonoscopy is still considered the best test to screen for colon cancer and polyps, no test is 100% accurate. He/she indicated understanding of the above and wishes to proceed.  All questions and concerns have been addressed.    [FreeTextEntry7] : with sip of water take losartan and levothyroxine

## 2025-04-22 NOTE — RESULTS/DATA
[ECG Reviewed] : reviewed [NSR] : normal sinus rhythm [Ventricular Rate___] : ventricular rate is [unfilled] beats per minute [P Waves Normal] : the P wave is normal [ECG Intervals KY.] : KY interval is normal [CO Interval___] : [unfilled] seconds [Normal QRS] : the QRS is normal [QRS Interval___] : QRS interval: [unfilled] seconds [ECG Axis] : the QRS axis is normal [QTc Interval___] : QTc interval: [unfilled] [Normal ST Segments] : the ST segments are normal [ECG T. Waves] : normal [de-identified] : LAE

## 2025-04-22 NOTE — RESULTS/DATA
[ECG Reviewed] : reviewed [NSR] : normal sinus rhythm [Ventricular Rate___] : ventricular rate is [unfilled] beats per minute [P Waves Normal] : the P wave is normal [ECG Intervals MI.] : MI interval is normal [IA Interval___] : [unfilled] seconds [Normal QRS] : the QRS is normal [QRS Interval___] : QRS interval: [unfilled] seconds [ECG Axis] : the QRS axis is normal [QTc Interval___] : QTc interval: [unfilled] [Normal ST Segments] : the ST segments are normal [ECG T. Waves] : normal [de-identified] : LAE

## 2025-05-08 NOTE — HISTORY OF PRESENT ILLNESS
[FreeTextEntry1] : 67-year-old Catherine Beasley returns for follow-up of tingling numbness in her fingers, burning in her feet. She reports now that she has difficulty holding her pen to write. 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 She had previously been investigated with the nerve tests and skin biopsy. The nerve tests were all reported to be incomplete due to refusal by the patient for complete examination: In the upper extremity NCV/EMG demonstrated entrapment of the median nerves bilaterally at the wrists, and the nerve test in the lower extremities had demonstrated a sensorimotor neuropathy. The skin biopsy did NOT show small fiber neuropathy. She has a nonspecific autoimmune inflammatory disorder that is being treated by Dr. Fiore rheumatology, using hydroxychloroquine and prednisone but she has been noncompliant/compliant intermittently with the treatment.  She has received treatment for breast cancer with lymph nodes. She reports increased sensitivity to all types of sensation but particularly needle sticks all over her body.  9/24/2024: She reports tingling and pain numbness in the radial 4 fingers of the right hand. She noticed also beginning of similar symptoms in the left hand. She has been wearing braces without much relief. She has been offered carpal tunnel surgery but was told to do nerve test before surgery.  5/7/2025 - 68 y/o, F, PMHx of breast cancer, s/p right mastectomy and left breast lumpectomy returns for a follow up with similar symptoms of pain, stiffness, numbness and tingling of right hand with nerve conduction velocity and EMG examination consistent with carpal tunnel syndrome on the right. At this time, she does not wish to proceed with surgery and would like to try wearing a brace and begin physical therapy sessions.  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  There is no sign of Plaquenil toxicity she also has hypertensive retinopathy in both eyes.

## 2025-05-08 NOTE — DISCUSSION/SUMMARY
[FreeTextEntry1] : I reviewed her past records.  I reviewed the MRI of the cervical spine that does not show significant change since 2/13/2021 and also does not show significant narrowing of neural foramina to explain her hand symptoms.  I reviewed the EMG nerve conduction velocity report.  She is advised to continue using the brace using the treatment from rheumatology.  Weight loss will certainly benefit carpal tunnel symptoms as well as other symptoms.  We discussed briefly the reason why weight loss can help with carpal tunnel syndrome (due to reduced subcutaneous fat in the carpal tunnel and reduced compression ) She expressed her concern about arterial calcium deposition.  Again I suggested that lowering her weight will slow progression of arterial disease and calcification.  Pistachio almonds and cashews and nuts are recommended from dietary studies nutritional studies for reducing arterial calcium deposition. show

## 2025-05-08 NOTE — PHYSICAL EXAM
[Person] : oriented to person [Place] : oriented to place [Time] : oriented to time [Concentration Intact] : normal concentrating ability [Visual Intact] : visual attention was ~T not ~L decreased [Naming Objects] : no difficulty naming common objects [Repeating Phrases] : no difficulty repeating a phrase [Writing A Sentence] : no difficulty writing a sentence [Fluency] : fluency intact [Comprehension] : comprehension intact [Reading] : reading intact [Past History] : adequate knowledge of personal past history [Cranial Nerves Optic (II)] : visual acuity intact bilaterally,  visual fields full to confrontation, pupils equal round and reactive to light [Cranial Nerves Oculomotor (III)] : extraocular motion intact [Cranial Nerves Trigeminal (V)] : facial sensation intact symmetrically [Cranial Nerves Facial (VII)] : face symmetrical [Cranial Nerves Vestibulocochlear (VIII)] : hearing was intact bilaterally [Cranial Nerves Glossopharyngeal (IX)] : tongue and palate midline [Cranial Nerves Accessory (XI - Cranial And Spinal)] : head turning and shoulder shrug symmetric [Cranial Nerves Hypoglossal (XII)] : there was no tongue deviation with protrusion [Motor Tone] : muscle tone was normal in all four extremities [Motor Strength] : muscle strength was normal in all four extremities [No Muscle Atrophy] : normal bulk in all four extremities [Sensation Tactile Decrease] : light touch was intact [Abnormal Walk] : normal gait [Balance] : balance was intact [Past-pointing] : there was no past-pointing [Tremor] : no tremor present [2+] : Triceps left 2+ [1+] : Brachioradialis left 1+ [0] : Ankle jerk left 0 [Plantar Reflex Right Only] : normal on the right [Plantar Reflex Left Only] : abnormal on the left [Outer Ear] : the ears and nose were normal in appearance [Oropharynx] : the oropharynx was normal [Neck Appearance] : the appearance of the neck was normal [Neck Cervical Mass (___cm)] : no neck mass was observed [Jugular Venous Distention Increased] : there was no jugular-venous distention [Thyroid Diffuse Enlargement] : the thyroid was not enlarged [Thyroid Nodule] : there were no palpable thyroid nodules [] : no respiratory distress [Auscultation Breath Sounds / Voice Sounds] : lungs were clear to auscultation bilaterally [Heart Rate And Rhythm] : heart rate was normal and rhythm regular [Heart Sounds] : normal S1 and S2 [Heart Sounds Gallop] : no gallops [Murmurs] : no murmurs [Heart Sounds Pericardial Friction Rub] : no pericardial rub

## 2025-05-20 NOTE — CONSULT LETTER
[Dear  ___] : Dear  [unfilled], [Courtesy Letter:] : I had the pleasure of seeing your patient, [unfilled], in my office today. [Please see my note below.] : Please see my note below. [Consult Closing:] : Thank you very much for allowing me to participate in the care of this patient.  If you have any questions, please do not hesitate to contact me. [Sincerely,] : Sincerely, [FreeTextEntry2] : Abhilash Erazo MD  [FreeTextEntry3] : Lili Fiore M.D., [facility]  [facility] [employer] at [facility]

## 2025-05-20 NOTE — ASSESSMENT
[FreeTextEntry1] : Patient with polyarthralgia with shoulder arthropathy improved in the setting of JENNIFER/centromereAbs+; RT CTS:   Photoprotection strongly emphasized in the setting of CTD: recommend yearly cardiology and pulmonary follow-up  rec.  Discussed the reinitiation of DMARD tx; patient apprehensive of additional therapies .  Urology workup continues for microscopic hematuria.  Patient will benefit from physical therapy to help with joint mobility and muscle strengthening.   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  Vitamin supplementation including Magnesium, Zinc and Calcium have been recommended for CTS;  wrist splint recommended for daily use; OT requisition given.   Quadriceps strengthening exercises encouraged.  Weight loss has been encouraged to reduce load over the medial joint line. Viscosupplementation has been encouraged to provide additional lubrication and joint support. The importance of dietary and lifestyle modifications were emphasized and the importance of weight loss modification in the setting of obesity to help reduce CV risk encouraged.  Food plan and exercise modification discussed in detail.  She is in agreement with the above plan will return to the office in three month's time.

## 2025-05-20 NOTE — HISTORY OF PRESENT ILLNESS
[FreeTextEntry1] : Patient explains discontinuing hydroxychloroquine and is off of cs tx.  She explains continued dropping of objects from the RT hand with accompanied paresthesia; she has deferred to carpal tunnel release surgery for the RT hand secondary to apprehension of side effects including increased risk of infections.  Recent blood testing reflecting hepatic and renal values along with blood counts within range.  Patient had received low-dose prednisone therapy early 2023 in the setting of +JENNIFER/centromere Abs and reported improvement in overall arthralgias as patient was apprehensive of reinitiating disease modifying agents. Patient noted resurfacing of pain in the hands and shoulder and resumed hydroxychloroquine. Prior to 2023, she explains being under the supervision of a prior rheumatologist who had put her on short course of hydroxychloroquine and steroid therapy in 2022. She reports arthralgias having resolved at that time and discontinuing therapy. Patient also seen by rheumatology and orthopedic colleagues at [facility], both teams focusing on physical therapy and viscosupplementation injections for the knees.  Most recently, patient under urology guidance for microscopic hematuria.  She explains history of breast CA in 1995,s/p chemo/radiation with RT sided mastectomy; she had reported in 2018 having taken therapy for lupus though did not recall names of medications.  Patient with regular follow-up with cardiology team.  Patient's last CT in 2025 reflective of subpleural post radiation fibrosis without evidence of fibrotic changes in other lobes to suggest a connective tissue disease related interstitial lung disease.  She currently denies morning stiffness and further denies visual disturbances, oral ulcers, rash or Raynaud's. She denies systemic symptoms.

## 2025-06-24 NOTE — ASSESSMENT
[FreeTextEntry1] :  is now 69 years old and returns for a cardiovascular follow up. She carries a medical history as outlined below: -Hx of Hypertension/hyperlipidemia/diabetes/coronary artery disease - SLE/ Rheumatoid arthritis - Right sided breast cancer (1996)-> S/P lumpectomy/Radiation/Chemo - Right sided breast cancer recurrence (2008)-> mastectomy/Radiation/ Chemo (Herceptin) -Left sided breast cancer - DCIS-(2016)-> S/P lumpectomy -> Tamoxifen X 5 years   1. Calcium score not done will reschedule - reordered 2, complains of some pain with atorvastatin but will continue medication 3.  Hydrochlorothiazide 25 mg /losartan for blood pressure control 4 Blood work to be redrawn with regards to lipids and LFTs 5.  Follow-up in person 6 months

## 2025-06-24 NOTE — CARDIOLOGY SUMMARY
[Normal] : normal [___] : [unfilled] [No Ischemia] : no Ischemia [LVEF ___%] : LVEF [unfilled]% [de-identified] : November 13, 2024 Sinus rhythm @ 78 bpm   [de-identified] : November 8, 2023 Normal LV function LVEF

## 2025-06-24 NOTE — REASON FOR VISIT
[Home] : at home, [unfilled] , at the time of the visit. [Medical Office: ([facility])___] : at the medical office located in  [Telephone (audio)] : This telephonic visit was provided via audio only technology. [Patient preference] : patient preference. [Verbal consent obtained from patient] : the patient, [unfilled] [Symptom and Test Evaluation] : symptom and test evaluation [Hyperlipidemia] : hyperlipidemia [Hypertension] : hypertension [Coronary Artery Disease] : coronary artery disease [FreeTextEntry1] : cardio-oncology, HLD, Hypothyroidism, lupus, rheumatoid , breast cancer, DMII

## 2025-06-24 NOTE — HISTORY OF PRESENT ILLNESS
[Home] : at home, [unfilled] , at the time of the visit. [Medical Office: ([facility])___] : at the medical office located in  [This encounter was initiated by telehealth (audio with video) and converted to telephone (audio only)] : This encounter was initiated by telehealth (audio with video) and converted to telephone (audio only) [Patient preference] : patient preference. [Verbal consent obtained from patient] : the patient, [unfilled] [FreeTextEntry1] : 69 year old  women presents for cardio oncology follow up.  PCP - Dr Micaela Erazo  PMHx:  -Hyperlipidemia elevated statin  -HTN  on medication  -diabetic hbaic 6.8  -CAD- elevated CA+ score -Hypothyroidism  -SLE  following with Rheum  Dr. Kasper, On Plaquenil and began Prednisone 2.5 mg daily. -Rheumatoid arthritis- Rheumatologist:  Dr. Lili Fiore -arthralgias in her hands, writs and knees.trigger injection of steroids into her left index finger. -numbness and tingling in her hands. She is being evaluated by neurologist, Dr. Robert Dias  -Hx of  Right breast cancer initially diagnosed in 1996 s/p lumpectomy/XRT /chemotherapy with subsequent recurrence of breast CA noted in 2008 s/p mastectomy/XRT/chemotherapy (Herceptin).  DCIS in her left breast in December of 2016 s/p  lumpectomy on December 21, 2016 with no reported lymph node involvement. Breast surgeon, Dr. Anette Richardson ([facility] [place]She was started on Tamoxifen x 5 years  Cardiac Testing: Nuclear exercise stress test in July of 2016 which ruled out any inducible ischemia. 2021 stress echo LVEF  69%7 METS of activity, fair //stress echo with no evidence of inducible ischemia.  EKG- Sinus rhythm, left axis @ 76 bpm  Echo 2016 Normal LV. EF 69% Atrial septum aneurysmal, Mild shunting across the atrium septum ECHO  November 2023 Normal LV systolic function GLS  -21.3%  (normal)Mild AR  Prior visits summarized as above.  Telehealth visit April 8, 2025 To review the results of the calcium score Per patient calcium score was reviewed with her by nurse practitioner of the women's heart program. 2025 Calcium score total was 97 : Left main 43 LAD 51 LCx 0 RCA 3, 50th to 75th percentile for females between the ages of 65-69 Lipid reviewed from February 2025: Cholesterol 186, HDL 49, triglycerides 117, . Given evidence of coronary artery disease LDL should be below 70. Nurse practitioner had increase the atorvastatin to 40 from 20 mg.  Patient is agreeable to take this although has some apprehension given her underlying arthritis and muscle pain from her rheumatological conditions of SLE  and RA.  Lipids and LFTs to be checked in 2 month  Telehealth visit June 17, 2025 Calcium score and echocardiogram to be reviewed Patient was unable to do ct scan secondary to IV issue Echocardiogram reviewed with patient ejection fraction 69% GLS -25 no significant valvular abnormalities or changes